# Patient Record
Sex: MALE | Race: WHITE | Employment: UNEMPLOYED | URBAN - METROPOLITAN AREA
[De-identification: names, ages, dates, MRNs, and addresses within clinical notes are randomized per-mention and may not be internally consistent; named-entity substitution may affect disease eponyms.]

---

## 2017-02-16 ENCOUNTER — HOSPITAL ENCOUNTER (OUTPATIENT)
Dept: PHYSICAL THERAPY | Age: 37
Discharge: HOME | End: 2017-02-16
Payer: COMMERCIAL

## 2017-02-16 PROCEDURE — 97163 PT EVAL HIGH COMPLEX 45 MIN: CPT

## 2017-02-16 NOTE — PROGRESS NOTES
Dhruv Briggs  : 1980 [address] [facility],[address]  [address] [place] [address].  [phone]   [phone]        OUTPATIENT PHYSICAL THERAPY:Initial Assessment 2017      ICD-10: Treatment Diagnosis: Pain in left shoulder (M25.512) , Stiffness of left shoulder, not elsewhere classified (M25.612)  Precautions/Allergies:   Bee sting [sting, bee] and Nicotine   Fall Risk Score: 3 (? 5 = High Risk)  MD Orders: Eval and Treat  MEDICAL/REFERRING DIAGNOSIS:  Recurrent dislocation, left shoulder [M24.412]  Impingement syndrome of left shoulder [M75.42]   DATE OF ONSET: Dec. 2016   REFERRING PHYSICIAN: Steve Reddy MD  RETURN PHYSICIAN APPOINTMENT: 3-17-17     INITIAL ASSESSMENT:  Mr. Sendy Lynch presents to therapy with pain in the L shoulder secondary to surgical debridement and SAD with Bankart repair back in December. Pt is still having difficulty with stiffness and ROM as well as decreased strength. Pt would benefit from skilled PT for stretching and strengthening as well as to return to functional and daily activities painfree. PROBLEM LIST (Impacting functional limitations):  1. Decreased Strength  2. Decreased ADL/Functional Activities  3. Decreased Balance  4. Increased Pain  5. Decreased Activity Tolerance  6. Decreased Flexibility/Joint Mobility INTERVENTIONS PLANNED:  1. Cold  2. Electrical Stimulation  3. Heat  4. Home Exercise Program (HEP)  5. Iontophoresis  6. Manual Therapy  7. Therapeutic Activites  8. Therapeutic Exercise/Strengthening  9. Ultrasound (US)   TREATMENT PLAN:  Effective Dates: 17 TO 3-31-17. Frequency/Duration: 2 times a week for 6 weeks  GOALS: (Goals have been discussed and agreed upon with patient.)  Short-Term Functional Goals: Time Frame: 2 weeks   1. Mr. Sendy Lynch to be independent with HEP. 2. Pt able to have no pain at rest in the L shoulder 100% of time.    3. Pt to tolerate full PROM in the L shoulder to gain return of mobility in the shoulder. Discharge Goals: Time Frame: 6 weeks   1. Pt able to demo full AROM in the L shoulder as prior level of function. 2. Pt to improve scapulohumeral rhythm and stability in the L shoulder to regain proper movement of the L shoulder to prevent subluxations. 3. Pt able to return to full functional activities painfree in the L shoulder. Rehabilitation Potential For Stated Goals: Good  Regarding Nico Llamas therapy, I certify that the treatment plan above will be carried out by a therapist or under their direction. Thank you for this referral,  Belinda Goltz, PT, DPT       Referring Physician Signature: Steve Reddy MD              Date                      HISTORY:   History of Present Injury/Illness (Reason for Referral):  Pt 43 y/o M s/p Bankart repair and SAD. Pt had surgery back in Dec due to frequent dislocations and subluxations due to frequent seizures (2-10 times a year). Pt since then had no therapy until now but is having a lot of pain. Pt is very stiff and is not moving the shoulder due to the pain. Pt has not had anymore seizures since then but is having trouble with mobility of the shoulder when trying to lift higher than 90 ° . Pt seems to be very educated on pain medication and repeatedly asked for medication due to the pain. Pt stated he discussed this with the doctor and he is currently out of meds. Pt refused to allow therapist to move the shoulder and stated he would not move the shoulder until pain medication was received. Past Medical History/Comorbidities:   Mr. Sendy Lynch  has a past medical history of Anxiety; Chronic pain; Ill-defined condition; Other ill-defined conditions(799.89); and Seizures ([facility] [address] He also has no past medical history of Difficult intubation; Malignant hyperthermia due to anesthesia; Nausea & vomiting; or Pseudocholinesterase deficiency. Mr. Sendy Lynch  has a past surgical history that includes orthopaedic (2016).   Social History/Living Environment:     Lives with father and step mom (Caregivers)   Prior Level of Function/Work/Activity:  Disabled   Dominant Side:         RIGHT  Previous Treatment Approaches:          None (refused since surgery)   Personal Factors:          Overall Behavior:  Depression, anxiety, (Possible drug seeking behavior)   Current Medications:    Current Outpatient Prescriptions:     ketorolac (TORADOL) 10 mg tablet, Take 1 Tab by mouth every six (6) hours as needed for Pain., Disp: 60 Tab, Rfl: 1    ibuprofen (MOTRIN) 800 mg tablet, Take 1 Tab by mouth every eight (8) hours as needed for Pain., Disp: 90 Tab, Rfl: 1    lamoTRIgine (LAMICTAL) 150 mg tablet, Take 150 mg by mouth two (2) times a day., Disp: , Rfl:     clonazePAM (KLONOPIN) 1 mg tablet, Take 1 mg by mouth four (4) times daily. , Disp: , Rfl:     sertraline (ZOLOFT) 50 mg tablet, Take 50 mg by mouth nightly., Disp: , Rfl:     EPIPEN JR 2-ROMI 0.15 mg/0.3 mL injection, INJECT 0.3ML INTO THE MUSCLE ONCE AS NEEDED FOR ANAPHYLAXIS, Disp: , Rfl: 2   Date Last Reviewed:  2/16/2017   # of Personal Factors/Comorbidities that affect the Plan of Care: 3+: HIGH COMPLEXITY   EXAMINATION:   Observation/Orthostatic Postural Assessment:          Guarded posture over L shoulder  Palpation:          N/a   ROM:     L shoulder AROM      Flexion: 90 °       Extension: 30 °       Abduction: 90 °       ER: 15 °       IR: 25 °    R shoulder       Flexion:       Extension:       Abduction:       ER:       IR:    Strength:     L shoulder (NT - pt refused)       Flexion:      Extension:      Abduction:      ER:      IR:    R shoulder       Flexion:       Extension:       Abduction:       ER:       IR:    Special Tests:          N/a   Neurological Screen:        Sensation: complaint of numbness in the 5th digit   Functional Mobility:         Independent   Balance:          Good    Body Structures Involved:  1. Nerves  2. Joints  3. Muscles  4.  Ligaments Body Functions Affected:  1. Mental  2. Sensory/Pain  3. Neuromusculoskeletal  4. Movement Related Activities and Participation Affected:  1. General Tasks and Demands  2. Mobility  3. Community, Social and [place]   # of elements that affect the Plan of Care: 4+: HIGH COMPLEXITY   CLINICAL PRESENTATION:   Presentation: Evolving clinical presentation with unstable and unpredictable characteristics: HIGH COMPLEXITY   CLINICAL DECISION MAKING:   Outcome Measure: Tool Used: UEFI  Score:  Initial: 2/80 Most Recent: X (Date: -- )   Interpretation of Score: 98% impaired   Outcome Measure Conversion Site    Medical Necessity:   · Patient is expected to demonstrate progress in strength and range of motion to increase independence with all functional activities painfree in the L shoulder. .  Reason for Services/Other Comments:  · Patient continues to require present interventions due to patient's inability to perform functional activities painfree in the L shoulder. .   Use of outcome tool(s) and clinical judgement create a POC that gives a: Difficult prediction of patient's progress: HIGH COMPLEXITY   TREATMENT:   (In addition to Assessment/Re-Assessment sessions the following treatments were rendered)  THERAPEUTIC EXERCISE: (see below for minutes):  Exercises per grid below to improve mobility and strength. Required moderate visual and verbal cues to promote proper body alignment, promote proper body posture and promote proper body mechanics. Progressed resistance, range and repetitions as indicated. MANUAL THERAPY: (see below for minutes): Joint mobilization and Soft tissue mobilization was utilized and necessary because of the patient's restricted joint motion and restricted motion of soft tissue. MODALITIES: (see below for minutes):      see chart below for details.       Date:        Modalities:                                Therapeutic Exercise:                                                        Proprioceptive Activities:                                Manual Therapy:                        Functional Activities:                                        HEP: pt was given pulleys today and educated on how to use at home for flexion and abduction. Treatment/Session Assessment:  Pt seems very concerned with pain medication and stated he was not going to move the arm until he had a refill of some kind of pain medication. Pt seems very educated on pain medication choices and he was fixated on pain medication during eval only. Pt continued to ask the therapist for pain medication and was told he would have to call the doctor regarding the pain. Pt would not allow therapist to move the shoulder into any PROM due to the pain and stated he didn't want to do therapy without having any pain medication. · Pain/ Symptoms: Initial:   8/10  Post Session:  8/10  ·   Compliance with Program/Exercises: Will assess as treatment progresses. · Recommendations/Intent for next treatment session: \"Next visit will focus on advancements to more challenging activities\".   Total Treatment Duration:  PT Patient Time In/Time Out  Time In: 1100  Time Out: 1145     Madina Gordillo, PT, DPT

## 2017-03-03 ENCOUNTER — HOSPITAL ENCOUNTER (OUTPATIENT)
Dept: PHYSICAL THERAPY | Age: 37
Discharge: HOME | End: 2017-03-03
Payer: COMMERCIAL

## 2017-03-03 PROCEDURE — 97110 THERAPEUTIC EXERCISES: CPT

## 2017-03-03 PROCEDURE — 97140 MANUAL THERAPY 1/> REGIONS: CPT

## 2017-03-03 NOTE — PROGRESS NOTES
Keyla Hilton  : 1980 [address] [facility],[address]  [address] [place] [address]  [phone]   [phone]        OUTPATIENT PHYSICAL THERAPY:Daily Note 3/3/2017      ICD-10: Treatment Diagnosis: Pain in left shoulder (M25.512) , Stiffness of left shoulder, not elsewhere classified (M25.612)  Precautions/Allergies:   Bee sting [sting, bee] and Nicotine   Fall Risk Score: 3 (? 5 = High Risk)  MD Orders: Eval and Treat  MEDICAL/REFERRING DIAGNOSIS:  Recurrent dislocation, left shoulder [M24.412]  Impingement syndrome of left shoulder [M75.42]   DATE OF ONSET: Dec. 2016   REFERRING PHYSICIAN: Jero Villeda MD  RETURN PHYSICIAN APPOINTMENT: 3-17-17     INITIAL ASSESSMENT:  Mr. Deisy Lubin presents to therapy with pain in the L shoulder secondary to surgical debridement and SAD with Bankart repair back in December. Pt is still having difficulty with stiffness and ROM as well as decreased strength. Pt would benefit from skilled PT for stretching and strengthening as well as to return to functional and daily activities painfree. PROBLEM LIST (Impacting functional limitations):  1. Decreased Strength  2. Decreased ADL/Functional Activities  3. Decreased Balance  4. Increased Pain  5. Decreased Activity Tolerance  6. Decreased Flexibility/Joint Mobility INTERVENTIONS PLANNED:  1. Cold  2. Electrical Stimulation  3. Heat  4. Home Exercise Program (HEP)  5. Iontophoresis  6. Manual Therapy  7. Therapeutic Activites  8. Therapeutic Exercise/Strengthening  9. Ultrasound (US)   TREATMENT PLAN:  Effective Dates: 17 TO 3-31-17. Frequency/Duration: 2 times a week for 6 weeks  GOALS: (Goals have been discussed and agreed upon with patient.)  Short-Term Functional Goals: Time Frame: 2 weeks   1. Mr. Deisy Lubin to be independent with HEP. 2. Pt able to have no pain at rest in the L shoulder 100% of time.    3. Pt to tolerate full PROM in the L shoulder to gain return of mobility in the shoulder. Discharge Goals: Time Frame: 6 weeks   1. Pt able to demo full AROM in the L shoulder as prior level of function. 2. Pt to improve scapulohumeral rhythm and stability in the L shoulder to regain proper movement of the L shoulder to prevent subluxations. 3. Pt able to return to full functional activities painfree in the L shoulder. Rehabilitation Potential For Stated Goals: Good                HISTORY:   History of Present Injury/Illness (Reason for Referral):  Pt 45 y/o M s/p Bankart repair and SAD. Pt had surgery back in Dec due to frequent dislocations and subluxations due to frequent seizures (2-10 times a year). Pt since then had no therapy until now but is having a lot of pain. Pt is very stiff and is not moving the shoulder due to the pain. Pt has not had anymore seizures since then but is having trouble with mobility of the shoulder when trying to lift higher than 90 ° . Pt seems to be very educated on pain medication and repeatedly asked for medication due to the pain. Pt stated he discussed this with the doctor and he is currently out of meds. Pt refused to allow therapist to move the shoulder and stated he would not move the shoulder until pain medication was received. Past Medical History/Comorbidities:   Mr. Sanna Marcus  has a past medical history of Anxiety; Chronic pain; Ill-defined condition; Other ill-defined conditions(799.89); and Seizures ([address] He also has no past medical history of Difficult intubation; Malignant hyperthermia due to anesthesia; Nausea & vomiting; or Pseudocholinesterase deficiency. Mr. Sanna Marcus  has a past surgical history that includes orthopaedic (2016) and orthopaedic (2017).   Social History/Living Environment:     Lives with father and step mom (Caregivers)   Prior Level of Function/Work/Activity:  Disabled   Dominant Side:         RIGHT  Previous Treatment Approaches:          None (refused since surgery)   Personal Factors:          Overall Behavior: Depression, anxiety, (Possible drug seeking behavior)   Current Medications:    Current Outpatient Prescriptions:     tiZANidine (ZANAFLEX) 2 mg tablet, Take 1 Tab by mouth three (3) times daily. , Disp: 30 Tab, Rfl: 0    ibuprofen (MOTRIN) 800 mg tablet, Take 1 Tab by mouth every eight (8) hours as needed for Pain., Disp: 90 Tab, Rfl: 1    lamoTRIgine (LAMICTAL) 150 mg tablet, Take 150 mg by mouth two (2) times a day., Disp: , Rfl:     clonazePAM (KLONOPIN) 1 mg tablet, Take 1 mg by mouth four (4) times daily. , Disp: , Rfl:     sertraline (ZOLOFT) 50 mg tablet, Take 50 mg by mouth nightly., Disp: , Rfl:     EPIPEN JR 2-ROMI 0.15 mg/0.3 mL injection, INJECT 0.3ML INTO THE MUSCLE ONCE AS NEEDED FOR ANAPHYLAXIS, Disp: , Rfl: 2   Date Last Reviewed:  3/3/2017   EXAMINATION:   Observation/Orthostatic Postural Assessment:          Guarded posture over L shoulder  Palpation:          N/a   ROM:     L shoulder AROM      Flexion: 90 °       Extension: 30 °       Abduction: 90 °       ER: 15 °       IR: 25 °    R shoulder       Flexion:       Extension:       Abduction:       ER:       IR:    Strength:     L shoulder (NT - pt refused)       Flexion:      Extension:      Abduction:      ER:      IR:    R shoulder       Flexion:       Extension:       Abduction:       ER:       IR:    Special Tests:          N/a   Neurological Screen:        Sensation: complaint of numbness in the 5th digit   Functional Mobility:         Independent   Balance:          Good    Body Structures Involved:  1. Nerves  2. Joints  3. Muscles  4. Ligaments Body Functions Affected:  1. Mental  2. Sensory/Pain  3. Neuromusculoskeletal  4. Movement Related Activities and Participation Affected:  1. General Tasks and Demands  2. Mobility  3. Community, Social and Civic Life   CLINICAL PRESENTATION:   CLINICAL DECISION MAKING:   Outcome Measure:    Tool Used: UEFI  Score:  Initial: 2/80 Most Recent: X (Date: -- )   Interpretation of Score: 98% impaired Outcome Measure Conversion Site    Medical Necessity:   · Patient is expected to demonstrate progress in strength and range of motion to increase independence with all functional activities painfree in the L shoulder. .  Reason for Services/Other Comments:  · Patient continues to require present interventions due to patient's inability to perform functional activities painfree in the L shoulder. .   TREATMENT:   (In addition to Assessment/Re-Assessment sessions the following treatments were rendered)  THERAPEUTIC EXERCISE: (see below for minutes):  Exercises per grid below to improve mobility and strength. Required moderate visual and verbal cues to promote proper body alignment, promote proper body posture and promote proper body mechanics. Progressed resistance, range and repetitions as indicated. MANUAL THERAPY: (see below for minutes): Joint mobilization and Soft tissue mobilization was utilized and necessary because of the patient's restricted joint motion and restricted motion of soft tissue. MODALITIES: (see below for minutes):      see chart below for details. Date: 3-3-17       Modalities:                                Therapeutic Exercise: 30 mins        Pulleys  x30 flexion and scaption       5 way isometrics  x30 each        Pendulums with 2#  x30 CW/CCW                                Proprioceptive Activities:                                Manual Therapy: 10 mins        PROM  10 mins                Functional Activities:                                        HEP: Pulleys and isometrics   Treatment/Session Assessment: Pt was able to tolerate all exercises and was told to continue using the pulleys at home and to add in isometrics which were printed and given to the patient. Pt agreed to understanding. Continue with POC. · Pain/ Symptoms: Initial:   7/10 Post Session:  8/10  ·   Compliance with Program/Exercises: Will assess as treatment progresses.   · Recommendations/Intent for next treatment session: \"Next visit will focus on advancements to more challenging activities\".   Total Treatment Duration:  PT Patient Time In/Time Out  Time In: 0800  Time Out: 0845     Sung Patterson, PT, DPT

## 2017-04-03 NOTE — PROGRESS NOTES
Jovanni Biswas  : 1980 [id number] [facility],[address]  [address] [place] [address]  [phone]   [phone]        OUTPATIENT PHYSICAL THERAPY:Discontinuation Summary 4/3/2017      ICD-10: Treatment Diagnosis: Pain in left shoulder (M25.512) , Stiffness of left shoulder, not elsewhere classified (M25.612)  Precautions/Allergies:   Bee sting [sting, bee] and Nicotine   Fall Risk Score: 3 (? 5 = High Risk)  MD Orders: Eval and Treat  MEDICAL/REFERRING DIAGNOSIS:  Recurrent dislocation, left shoulder [M24.412]  Impingement syndrome of left shoulder [M75.42]   DATE OF ONSET: Dec. 2016   REFERRING PHYSICIAN: Sheila Ford MD  RETURN PHYSICIAN APPOINTMENT: 3-17-17     INITIAL ASSESSMENT:  Mr. German Casillas presents to therapy with pain in the L shoulder secondary to surgical debridement and SAD with Bankart repair back in December. Pt is still having difficulty with stiffness and ROM as well as decreased strength. Pt would benefit from skilled PT for stretching and strengthening as well as to return to functional and daily activities painfree. PROBLEM LIST (Impacting functional limitations):  1. Decreased Strength  2. Decreased ADL/Functional Activities  3. Decreased Balance  4. Increased Pain  5. Decreased Activity Tolerance  6. Decreased Flexibility/Joint Mobility INTERVENTIONS PLANNED:  1. Cold  2. Electrical Stimulation  3. Heat  4. Home Exercise Program (HEP)  5. Iontophoresis  6. Manual Therapy  7. Therapeutic Activites  8. Therapeutic Exercise/Strengthening  9. Ultrasound (US)   TREATMENT PLAN:  Effective Dates: 17 TO 3-31-17. Frequency/Duration: 2 times a week for 6 weeks  GOALS: (Goals have been discussed and agreed upon with patient.)  Short-Term Functional Goals: Time Frame: 2 weeks   1. Mr. German Casillas to be independent with HEP. 2. Pt able to have no pain at rest in the L shoulder 100% of time.    3. Pt to tolerate full PROM in the L shoulder to gain return of mobility in the shoulder. Discharge Goals: Time Frame: 6 weeks   1. Pt able to demo full AROM in the L shoulder as prior level of function. 2. Pt to improve scapulohumeral rhythm and stability in the L shoulder to regain proper movement of the L shoulder to prevent subluxations. 3. Pt able to return to full functional activities painfree in the L shoulder. Rehabilitation Potential For Stated Goals: Good                HISTORY:   History of Present Injury/Illness (Reason for Referral):  Pt 43 y/o M s/p Bankart repair and SAD. Pt had surgery back in Dec due to frequent dislocations and subluxations due to frequent seizures (2-10 times a year). Pt since then had no therapy until now but is having a lot of pain. Pt is very stiff and is not moving the shoulder due to the pain. Pt has not had anymore seizures since then but is having trouble with mobility of the shoulder when trying to lift higher than 90 ° . Pt seems to be very educated on pain medication and repeatedly asked for medication due to the pain. Pt stated he discussed this with the doctor and he is currently out of meds. Pt refused to allow therapist to move the shoulder and stated he would not move the shoulder until pain medication was received. Past Medical History/Comorbidities:   Mr. Rai Molina  has a past medical history of Anxiety; Chronic pain; Ill-defined condition; Other ill-defined conditions(799.89); and Seizures ([address] He also has no past medical history of Difficult intubation; Malignant hyperthermia due to anesthesia; Nausea & vomiting; or Pseudocholinesterase deficiency. Mr. Rai Molina  has a past surgical history that includes orthopaedic (2016) and orthopaedic (2017).   Social History/Living Environment:     Lives with father and step mom (Caregivers)   Prior Level of Function/Work/Activity:  Disabled   Dominant Side:         RIGHT  Previous Treatment Approaches:          None (refused since surgery)   Personal Factors:          Overall Behavior:  Depression, anxiety, (Possible drug seeking behavior)   Current Medications:    Current Outpatient Prescriptions:     clonazePAM (KLONOPIN) 1 mg tablet, Take 1 Tab by mouth four (4) times daily. Max Daily Amount: 4 mg., Disp: 120 Tab, Rfl: 5    lamoTRIgine (LAMICTAL) 150 mg tablet, Take 1 Tab by mouth two (2) times a day., Disp: 60 Tab, Rfl: 6    sertraline (ZOLOFT) 50 mg tablet, Take 1 Tab by mouth nightly., Disp: 90 Tab, Rfl: 3    tiZANidine (ZANAFLEX) 2 mg tablet, Take 1 Tab by mouth three (3) times daily. , Disp: 30 Tab, Rfl: 0    ibuprofen (MOTRIN) 800 mg tablet, Take 1 Tab by mouth every eight (8) hours as needed for Pain., Disp: 90 Tab, Rfl: 1    EPIPEN JR 2-ROMI 0.15 mg/0.3 mL injection, INJECT 0.3ML INTO THE MUSCLE ONCE AS NEEDED FOR ANAPHYLAXIS, Disp: , Rfl: 2   Date Last Reviewed:  3/3/2017   EXAMINATION:   Observation/Orthostatic Postural Assessment:          Guarded posture over L shoulder  Palpation:          N/a   ROM:     L shoulder AROM      Flexion: 90 °       Extension: 30 °       Abduction: 90 °       ER: 15 °       IR: 25 °    R shoulder       Flexion:       Extension:       Abduction:       ER:       IR:    Strength:     L shoulder (NT - pt refused)       Flexion:      Extension:      Abduction:      ER:      IR:    R shoulder       Flexion:       Extension:       Abduction:       ER:       IR:    Special Tests:          N/a   Neurological Screen:        Sensation: complaint of numbness in the 5th digit   Functional Mobility:         Independent   Balance:          Good    Body Structures Involved:  1. Nerves  2. Joints  3. Muscles  4. Ligaments Body Functions Affected:  1. Mental  2. Sensory/Pain  3. Neuromusculoskeletal  4. Movement Related Activities and Participation Affected:  1. General Tasks and Demands  2. Mobility  3. Community, Social and Civic Life   CLINICAL PRESENTATION:   CLINICAL DECISION MAKING:   Outcome Measure:    Tool Used: UEFI  Score:  Initial: 2/80 Most Recent: X (Date: -- )   Interpretation of Score: 98% impaired   Outcome Measure Conversion Site    Medical Necessity:   · Patient is expected to demonstrate progress in strength and range of motion to increase independence with all functional activities painfree in the L shoulder. .  Reason for Services/Other Comments:  · Patient continues to require present interventions due to patient's inability to perform functional activities painfree in the L shoulder. .   TREATMENT:   (In addition to Assessment/Re-Assessment sessions the following treatments were rendered)  THERAPEUTIC EXERCISE: (see below for minutes):  Exercises per grid below to improve mobility and strength. Required moderate visual and verbal cues to promote proper body alignment, promote proper body posture and promote proper body mechanics. Progressed resistance, range and repetitions as indicated. MANUAL THERAPY: (see below for minutes): Joint mobilization and Soft tissue mobilization was utilized and necessary because of the patient's restricted joint motion and restricted motion of soft tissue. MODALITIES: (see below for minutes):      see chart below for details. Date: 3-3-17       Modalities:                                Therapeutic Exercise: 30 mins        Pulleys  x30 flexion and scaption       5 way isometrics  x30 each        Pendulums with 2#  x30 CW/CCW                                Proprioceptive Activities:                                Manual Therapy: 10 mins        PROM  10 mins                Functional Activities:                                        HEP: Pulleys and isometrics   Treatment/Session Assessment: Pt did not return to therapy following this appt. Pt is discontinued.      · Pain/ Symptoms: Initial:   7/10 Post Session:  8/10       Kerri Quesada, PT, DPT

## 2017-07-13 ENCOUNTER — HOSPITAL ENCOUNTER (OUTPATIENT)
Dept: GENERAL RADIOLOGY | Age: 37
Discharge: HOME | End: 2017-07-13
Attending: NURSE PRACTITIONER
Payer: COMMERCIAL

## 2017-07-13 DIAGNOSIS — M54.50 ACUTE BILATERAL LOW BACK PAIN WITHOUT SCIATICA: ICD-10-CM

## 2017-07-13 PROBLEM — G40.209 PARTIAL SYMPTOMATIC EPILEPSY WITH COMPLEX PARTIAL SEIZURES, NOT INTRACTABLE, WITHOUT STATUS EPILEPTICUS (HCC): Status: ACTIVE | Noted: 2017-07-13

## 2017-07-13 PROCEDURE — 72100 X-RAY EXAM L-S SPINE 2/3 VWS: CPT

## 2017-07-13 PROCEDURE — 72072 X-RAY EXAM THORAC SPINE 3VWS: CPT

## 2017-07-25 ENCOUNTER — HOSPITAL ENCOUNTER (OUTPATIENT)
Dept: MRI IMAGING | Age: 37
Discharge: HOME | End: 2017-07-25
Attending: NURSE PRACTITIONER
Payer: COMMERCIAL

## 2017-07-25 VITALS — BODY MASS INDEX: 20.98 KG/M2 | WEIGHT: 130 LBS

## 2017-07-25 DIAGNOSIS — G40.909 SEIZURE DISORDER (HCC): ICD-10-CM

## 2017-07-25 PROCEDURE — 70553 MRI BRAIN STEM W/O & W/DYE: CPT

## 2017-07-25 PROCEDURE — 74011250636 HC RX REV CODE- 250/636

## 2017-07-25 PROCEDURE — A9577 INJ MULTIHANCE: HCPCS

## 2017-07-25 RX ORDER — SODIUM CHLORIDE 0.9 % (FLUSH) 0.9 %
10 SYRINGE (ML) INJECTION
Status: COMPLETED | OUTPATIENT
Start: 2017-07-25 | End: 2017-07-25

## 2017-07-25 RX ADMIN — GADOBENATE DIMEGLUMINE 13 ML: 529 INJECTION, SOLUTION INTRAVENOUS at 08:39

## 2017-07-25 RX ADMIN — Medication 10 ML: at 08:39

## 2022-03-20 PROBLEM — G40.209 PARTIAL SYMPTOMATIC EPILEPSY WITH COMPLEX PARTIAL SEIZURES, NOT INTRACTABLE, WITHOUT STATUS EPILEPTICUS (HCC): Status: ACTIVE | Noted: 2017-07-13

## 2022-09-22 ENCOUNTER — HOSPITAL ENCOUNTER (EMERGENCY)
Dept: CT IMAGING | Age: 42
Discharge: HOME | DRG: 153 | End: 2022-09-25
Payer: COMMERCIAL

## 2022-09-22 ENCOUNTER — HOSPITAL ENCOUNTER (INPATIENT)
Age: 42
LOS: 2 days | Discharge: HOME | DRG: 153 | End: 2022-09-24
Attending: EMERGENCY MEDICINE | Admitting: FAMILY MEDICINE
Payer: COMMERCIAL

## 2022-09-22 DIAGNOSIS — J34.0 NASAL ABSCESS: Primary | ICD-10-CM

## 2022-09-22 PROBLEM — F11.21 OPIOID USE DISORDER, SEVERE, IN SUSTAINED REMISSION, ON MAINTENANCE THERAPY, DEPENDENCE (HCC): Chronic | Status: ACTIVE | Noted: 2022-09-22

## 2022-09-22 PROBLEM — K04.7 ABSCESS, DENTAL: Status: ACTIVE | Noted: 2022-09-22

## 2022-09-22 PROBLEM — R45.851 SUICIDAL IDEATIONS: Status: RESOLVED | Noted: 2018-12-14 | Resolved: 2022-09-22

## 2022-09-22 PROBLEM — E87.1 HYPONATREMIA: Status: ACTIVE | Noted: 2022-09-22

## 2022-09-22 PROBLEM — B96.89 ACUTE BACTERIAL RHINOSINUSITIS: Status: ACTIVE | Noted: 2022-09-22

## 2022-09-22 PROBLEM — J01.90 ACUTE BACTERIAL RHINOSINUSITIS: Status: ACTIVE | Noted: 2022-09-22

## 2022-09-22 PROBLEM — F99 INSOMNIA DUE TO OTHER MENTAL DISORDER: Chronic | Status: ACTIVE | Noted: 2022-09-22

## 2022-09-22 PROBLEM — F19.94 SUBSTANCE OR MEDICATION-INDUCED DEPRESSIVE DISORDER (HCC): Status: ACTIVE | Noted: 2018-12-17

## 2022-09-22 PROBLEM — F51.05 INSOMNIA DUE TO OTHER MENTAL DISORDER: Chronic | Status: ACTIVE | Noted: 2022-09-22

## 2022-09-22 PROBLEM — R45.851 SUICIDAL IDEATIONS: Status: ACTIVE | Noted: 2018-12-14

## 2022-09-22 PROBLEM — E87.6 HYPOKALEMIA: Status: ACTIVE | Noted: 2022-09-22

## 2022-09-22 PROBLEM — J32.9: Status: ACTIVE | Noted: 2022-09-22

## 2022-09-22 LAB
ALBUMIN SERPL-MCNC: 3.7 G/DL (ref 3.5–5)
ALBUMIN/GLOB SERPL: 0.8 {RATIO} (ref 1.2–3.5)
ALP SERPL-CCNC: 134 U/L (ref 50–136)
ALT SERPL-CCNC: 43 U/L (ref 12–65)
ANION GAP SERPL CALC-SCNC: 8 MMOL/L (ref 4–13)
AST SERPL-CCNC: 49 U/L (ref 15–37)
BASOPHILS # BLD: 0 K/UL (ref 0–0.2)
BASOPHILS NFR BLD: 0 % (ref 0–2)
BILIRUB SERPL-MCNC: 0.7 MG/DL (ref 0.2–1.1)
BUN SERPL-MCNC: 10 MG/DL (ref 6–23)
CALCIUM SERPL-MCNC: 9.7 MG/DL (ref 8.3–10.4)
CHLORIDE SERPL-SCNC: 100 MMOL/L (ref 101–110)
CO2 SERPL-SCNC: 26 MMOL/L (ref 21–32)
CREAT SERPL-MCNC: 0.6 MG/DL (ref 0.8–1.5)
CRP SERPL-MCNC: 16.6 MG/DL (ref 0–0.9)
DIFFERENTIAL METHOD BLD: ABNORMAL
EOSINOPHIL # BLD: 0.1 K/UL (ref 0–0.8)
EOSINOPHIL NFR BLD: 0 % (ref 0.5–7.8)
ERYTHROCYTE [DISTWIDTH] IN BLOOD BY AUTOMATED COUNT: 13.2 % (ref 11.9–14.6)
ERYTHROCYTE [SEDIMENTATION RATE] IN BLOOD: 88 MM/HR (ref 0–20)
GLOBULIN SER CALC-MCNC: 4.5 G/DL (ref 2.3–3.5)
GLUCOSE SERPL-MCNC: 132 MG/DL (ref 65–100)
HCT VFR BLD AUTO: 35.4 % (ref 41.1–50.3)
HGB BLD-MCNC: 12.2 G/DL (ref 13.6–17.2)
IMM GRANULOCYTES # BLD AUTO: 0.1 K/UL (ref 0–0.5)
IMM GRANULOCYTES NFR BLD AUTO: 1 % (ref 0–5)
LACTATE SERPL-SCNC: 0.7 MMOL/L (ref 0.4–2)
LYMPHOCYTES # BLD: 1.6 K/UL (ref 0.5–4.6)
LYMPHOCYTES NFR BLD: 8 % (ref 13–44)
MCH RBC QN AUTO: 31.3 PG (ref 26.1–32.9)
MCHC RBC AUTO-ENTMCNC: 34.5 G/DL (ref 31.4–35)
MCV RBC AUTO: 90.8 FL (ref 79.6–97.8)
MONOCYTES # BLD: 1.4 K/UL (ref 0.1–1.3)
MONOCYTES NFR BLD: 7 % (ref 4–12)
NEUTS SEG # BLD: 15.7 K/UL (ref 1.7–8.2)
NEUTS SEG NFR BLD: 84 % (ref 43–78)
NRBC # BLD: 0 K/UL (ref 0–0.2)
PLATELET # BLD AUTO: 417 K/UL (ref 150–450)
PMV BLD AUTO: 8.8 FL (ref 9.4–12.3)
POTASSIUM SERPL-SCNC: 3.3 MMOL/L (ref 3.5–5.1)
PROT SERPL-MCNC: 8.2 G/DL (ref 6.3–8.2)
RBC # BLD AUTO: 3.9 M/UL (ref 4.23–5.6)
SODIUM SERPL-SCNC: 134 MMOL/L (ref 138–145)
WBC # BLD AUTO: 18.9 K/UL (ref 4.3–11.1)

## 2022-09-22 PROCEDURE — G0378 HOSPITAL OBSERVATION PER HR: HCPCS

## 2022-09-22 PROCEDURE — 87186 SC STD MICRODIL/AGAR DIL: CPT

## 2022-09-22 PROCEDURE — 99253 IP/OBS CNSLTJ NEW/EST LOW 45: CPT | Performed by: STUDENT IN AN ORGANIZED HEALTH CARE EDUCATION/TRAINING PROGRAM

## 2022-09-22 PROCEDURE — 96374 THER/PROPH/DIAG INJ IV PUSH: CPT

## 2022-09-22 PROCEDURE — 87077 CULTURE AEROBIC IDENTIFY: CPT

## 2022-09-22 PROCEDURE — 1100000000 HC RM PRIVATE

## 2022-09-22 PROCEDURE — 6360000002 HC RX W HCPCS: Performed by: EMERGENCY MEDICINE

## 2022-09-22 PROCEDURE — 96365 THER/PROPH/DIAG IV INF INIT: CPT

## 2022-09-22 PROCEDURE — 80053 COMPREHEN METABOLIC PANEL: CPT

## 2022-09-22 PROCEDURE — 87070 CULTURE OTHR SPECIMN AEROBIC: CPT

## 2022-09-22 PROCEDURE — 99285 EMERGENCY DEPT VISIT HI MDM: CPT

## 2022-09-22 PROCEDURE — 2580000003 HC RX 258: Performed by: STUDENT IN AN ORGANIZED HEALTH CARE EDUCATION/TRAINING PROGRAM

## 2022-09-22 PROCEDURE — 85025 COMPLETE CBC W/AUTO DIFF WBC: CPT

## 2022-09-22 PROCEDURE — 87040 BLOOD CULTURE FOR BACTERIA: CPT

## 2022-09-22 PROCEDURE — 2500000003 HC RX 250 WO HCPCS: Performed by: STUDENT IN AN ORGANIZED HEALTH CARE EDUCATION/TRAINING PROGRAM

## 2022-09-22 PROCEDURE — 70487 CT MAXILLOFACIAL W/DYE: CPT

## 2022-09-22 PROCEDURE — 85652 RBC SED RATE AUTOMATED: CPT

## 2022-09-22 PROCEDURE — 6370000000 HC RX 637 (ALT 250 FOR IP): Performed by: STUDENT IN AN ORGANIZED HEALTH CARE EDUCATION/TRAINING PROGRAM

## 2022-09-22 PROCEDURE — 96366 THER/PROPH/DIAG IV INF ADDON: CPT

## 2022-09-22 PROCEDURE — 6360000004 HC RX CONTRAST MEDICATION: Performed by: SPECIALIST

## 2022-09-22 PROCEDURE — 83605 ASSAY OF LACTIC ACID: CPT

## 2022-09-22 PROCEDURE — 86140 C-REACTIVE PROTEIN: CPT

## 2022-09-22 PROCEDURE — 30020 DRAINAGE OF NOSE LESION: CPT | Performed by: STUDENT IN AN ORGANIZED HEALTH CARE EDUCATION/TRAINING PROGRAM

## 2022-09-22 RX ORDER — SODIUM CHLORIDE 0.9 % (FLUSH) 0.9 %
10 SYRINGE (ML) INJECTION
Status: DISCONTINUED | OUTPATIENT
Start: 2022-09-22 | End: 2022-09-22 | Stop reason: SDUPTHER

## 2022-09-22 RX ORDER — IBUPROFEN 800 MG/1
800 TABLET ORAL
Status: COMPLETED | OUTPATIENT
Start: 2022-09-22 | End: 2022-09-23

## 2022-09-22 RX ORDER — POTASSIUM CHLORIDE 20 MEQ/1
40 TABLET, EXTENDED RELEASE ORAL ONCE
Status: COMPLETED | OUTPATIENT
Start: 2022-09-22 | End: 2022-09-23

## 2022-09-22 RX ORDER — BUPRENORPHINE HYDROCHLORIDE AND NALOXONE HYDROCHLORIDE DIHYDRATE 8; 2 MG/1; MG/1
1 TABLET SUBLINGUAL DAILY
Status: DISCONTINUED | OUTPATIENT
Start: 2022-09-23 | End: 2022-09-24 | Stop reason: HOSPADM

## 2022-09-22 RX ORDER — SODIUM CHLORIDE 9 MG/ML
INJECTION, SOLUTION INTRAVENOUS PRN
Status: DISCONTINUED | OUTPATIENT
Start: 2022-09-22 | End: 2022-09-24 | Stop reason: HOSPADM

## 2022-09-22 RX ORDER — PRAZOSIN HYDROCHLORIDE 1 MG/1
2 CAPSULE ORAL NIGHTLY
Status: DISCONTINUED | OUTPATIENT
Start: 2022-09-22 | End: 2022-09-24 | Stop reason: HOSPADM

## 2022-09-22 RX ORDER — CIPROFLOXACIN 500 MG/1
500 TABLET, FILM COATED ORAL EVERY 12 HOURS
Status: DISCONTINUED | OUTPATIENT
Start: 2022-09-22 | End: 2022-09-23

## 2022-09-22 RX ORDER — MIRTAZAPINE 15 MG/1
15 TABLET, FILM COATED ORAL NIGHTLY
Status: DISCONTINUED | OUTPATIENT
Start: 2022-09-22 | End: 2022-09-24 | Stop reason: HOSPADM

## 2022-09-22 RX ORDER — ACETAMINOPHEN 650 MG/1
650 SUPPOSITORY RECTAL EVERY 6 HOURS PRN
Status: DISCONTINUED | OUTPATIENT
Start: 2022-09-22 | End: 2022-09-24 | Stop reason: HOSPADM

## 2022-09-22 RX ORDER — RISPERIDONE 0.5 MG/1
0.5 TABLET, FILM COATED ORAL NIGHTLY
Status: DISCONTINUED | OUTPATIENT
Start: 2022-09-22 | End: 2022-09-24 | Stop reason: HOSPADM

## 2022-09-22 RX ORDER — FLUTICASONE PROPIONATE 50 MCG
1 SPRAY, SUSPENSION (ML) NASAL DAILY
Status: DISCONTINUED | OUTPATIENT
Start: 2022-09-23 | End: 2022-09-22

## 2022-09-22 RX ORDER — SODIUM CHLORIDE 0.9 % (FLUSH) 0.9 %
5-40 SYRINGE (ML) INJECTION PRN
Status: DISCONTINUED | OUTPATIENT
Start: 2022-09-22 | End: 2022-09-24 | Stop reason: HOSPADM

## 2022-09-22 RX ORDER — ONDANSETRON 2 MG/ML
4 INJECTION INTRAMUSCULAR; INTRAVENOUS EVERY 6 HOURS PRN
Status: DISCONTINUED | OUTPATIENT
Start: 2022-09-22 | End: 2022-09-24 | Stop reason: HOSPADM

## 2022-09-22 RX ORDER — POLYETHYLENE GLYCOL 3350 17 G/17G
17 POWDER, FOR SOLUTION ORAL DAILY PRN
Status: DISCONTINUED | OUTPATIENT
Start: 2022-09-22 | End: 2022-09-24 | Stop reason: HOSPADM

## 2022-09-22 RX ORDER — POTASSIUM CHLORIDE 20 MEQ/1
40 TABLET, EXTENDED RELEASE ORAL PRN
Status: DISCONTINUED | OUTPATIENT
Start: 2022-09-22 | End: 2022-09-24 | Stop reason: HOSPADM

## 2022-09-22 RX ORDER — 0.9 % SODIUM CHLORIDE 0.9 %
100 INTRAVENOUS SOLUTION INTRAVENOUS
Status: DISCONTINUED | OUTPATIENT
Start: 2022-09-22 | End: 2022-09-26 | Stop reason: HOSPADM

## 2022-09-22 RX ORDER — NICOTINE 21 MG/24HR
1 PATCH, TRANSDERMAL 24 HOURS TRANSDERMAL DAILY PRN
Status: DISCONTINUED | OUTPATIENT
Start: 2022-09-22 | End: 2022-09-24 | Stop reason: HOSPADM

## 2022-09-22 RX ORDER — ONDANSETRON 4 MG/1
4 TABLET, ORALLY DISINTEGRATING ORAL EVERY 8 HOURS PRN
Status: DISCONTINUED | OUTPATIENT
Start: 2022-09-22 | End: 2022-09-24 | Stop reason: HOSPADM

## 2022-09-22 RX ORDER — ENOXAPARIN SODIUM 100 MG/ML
40 INJECTION SUBCUTANEOUS DAILY
Status: DISCONTINUED | OUTPATIENT
Start: 2022-09-23 | End: 2022-09-24 | Stop reason: HOSPADM

## 2022-09-22 RX ORDER — BUPRENORPHINE HYDROCHLORIDE AND NALOXONE HYDROCHLORIDE DIHYDRATE 8; 2 MG/1; MG/1
1 TABLET SUBLINGUAL
Status: COMPLETED | OUTPATIENT
Start: 2022-09-22 | End: 2022-09-22

## 2022-09-22 RX ORDER — FLUTICASONE PROPIONATE 50 MCG
2 SPRAY, SUSPENSION (ML) NASAL 4 TIMES DAILY
Status: DISCONTINUED | OUTPATIENT
Start: 2022-09-22 | End: 2022-09-24 | Stop reason: HOSPADM

## 2022-09-22 RX ORDER — 0.9 % SODIUM CHLORIDE 0.9 %
1000 INTRAVENOUS SOLUTION INTRAVENOUS
Status: COMPLETED | OUTPATIENT
Start: 2022-09-22 | End: 2022-09-22

## 2022-09-22 RX ORDER — ONDANSETRON 4 MG/1
4 TABLET, ORALLY DISINTEGRATING ORAL
Status: COMPLETED | OUTPATIENT
Start: 2022-09-22 | End: 2022-09-22

## 2022-09-22 RX ORDER — SODIUM CHLORIDE, SODIUM LACTATE, POTASSIUM CHLORIDE, CALCIUM CHLORIDE 600; 310; 30; 20 MG/100ML; MG/100ML; MG/100ML; MG/100ML
INJECTION, SOLUTION INTRAVENOUS CONTINUOUS
Status: DISCONTINUED | OUTPATIENT
Start: 2022-09-22 | End: 2022-09-23

## 2022-09-22 RX ORDER — MAGNESIUM SULFATE IN WATER 40 MG/ML
2000 INJECTION, SOLUTION INTRAVENOUS PRN
Status: DISCONTINUED | OUTPATIENT
Start: 2022-09-22 | End: 2022-09-24 | Stop reason: HOSPADM

## 2022-09-22 RX ORDER — SERTRALINE HYDROCHLORIDE 25 MG/1
50 TABLET, FILM COATED ORAL DAILY
Status: DISCONTINUED | OUTPATIENT
Start: 2022-09-23 | End: 2022-09-22

## 2022-09-22 RX ORDER — CLINDAMYCIN PHOSPHATE 900 MG/50ML
900 INJECTION INTRAVENOUS
Status: COMPLETED | OUTPATIENT
Start: 2022-09-22 | End: 2022-09-23

## 2022-09-22 RX ORDER — SERTRALINE HYDROCHLORIDE 100 MG/1
100 TABLET, FILM COATED ORAL DAILY
Status: DISCONTINUED | OUTPATIENT
Start: 2022-09-23 | End: 2022-09-24 | Stop reason: HOSPADM

## 2022-09-22 RX ORDER — CETIRIZINE HYDROCHLORIDE 10 MG/1
10 TABLET ORAL DAILY
Status: DISCONTINUED | OUTPATIENT
Start: 2022-09-23 | End: 2022-09-24 | Stop reason: HOSPADM

## 2022-09-22 RX ORDER — SODIUM CHLORIDE 0.9 % (FLUSH) 0.9 %
5-40 SYRINGE (ML) INJECTION EVERY 12 HOURS SCHEDULED
Status: DISCONTINUED | OUTPATIENT
Start: 2022-09-22 | End: 2022-09-24 | Stop reason: HOSPADM

## 2022-09-22 RX ORDER — ACETAMINOPHEN 325 MG/1
650 TABLET ORAL EVERY 6 HOURS PRN
Status: DISCONTINUED | OUTPATIENT
Start: 2022-09-22 | End: 2022-09-24 | Stop reason: HOSPADM

## 2022-09-22 RX ORDER — POTASSIUM CHLORIDE 7.45 MG/ML
10 INJECTION INTRAVENOUS PRN
Status: DISCONTINUED | OUTPATIENT
Start: 2022-09-22 | End: 2022-09-24 | Stop reason: HOSPADM

## 2022-09-22 RX ORDER — IBUPROFEN 400 MG/1
400 TABLET ORAL EVERY 6 HOURS PRN
Status: DISCONTINUED | OUTPATIENT
Start: 2022-09-22 | End: 2022-09-24 | Stop reason: HOSPADM

## 2022-09-22 RX ADMIN — POTASSIUM BICARBONATE 20 MEQ: 782 TABLET, EFFERVESCENT ORAL at 18:58

## 2022-09-22 RX ADMIN — BUPRENORPHINE AND NALOXONE 1 TABLET: 8; 2 TABLET SUBLINGUAL at 22:19

## 2022-09-22 RX ADMIN — SODIUM CHLORIDE 1000 ML: 9 INJECTION, SOLUTION INTRAVENOUS at 18:54

## 2022-09-22 RX ADMIN — IOPAMIDOL 100 ML: 755 INJECTION, SOLUTION INTRAVENOUS at 20:16

## 2022-09-22 RX ADMIN — ONDANSETRON 4 MG: 4 TABLET, ORALLY DISINTEGRATING ORAL at 18:57

## 2022-09-22 RX ADMIN — CLINDAMYCIN PHOSPHATE 900 MG: 900 INJECTION, SOLUTION INTRAVENOUS at 20:35

## 2022-09-22 ASSESSMENT — ENCOUNTER SYMPTOMS
COLOR CHANGE: 0
CHOKING: 0
FACIAL SWELLING: 1
ABDOMINAL PAIN: 0
CONSTIPATION: 0
EYE PAIN: 0
SINUS PAIN: 0
APNEA: 0
RHINORRHEA: 0
SINUS PRESSURE: 1
EYE REDNESS: 0
ABDOMINAL DISTENTION: 0
BLOOD IN STOOL: 0
DIARRHEA: 0
NAUSEA: 0
VOICE CHANGE: 0
SINUS PAIN: 1
VOMITING: 0
COUGH: 0
RHINORRHEA: 1
WHEEZING: 0
SORE THROAT: 0
STRIDOR: 0
CHEST TIGHTNESS: 0
SINUS PRESSURE: 0
EYE DISCHARGE: 0
COLOR CHANGE: 1
SHORTNESS OF BREATH: 0
PHOTOPHOBIA: 0

## 2022-09-22 ASSESSMENT — PAIN - FUNCTIONAL ASSESSMENT: PAIN_FUNCTIONAL_ASSESSMENT: 0-10

## 2022-09-22 ASSESSMENT — PAIN SCALES - GENERAL: PAINLEVEL_OUTOF10: 5

## 2022-09-22 NOTE — ED PROVIDER NOTES
Emergency Department Provider Note                   PCP:                LEVON Danielle NP               Age: 36 y.o. Sex: male       ICD-10-CM    1. Skin infection  L08.9           DISPOSITION          MDM  Number of Diagnoses or Management Options  Skin infection  Diagnosis management comments: 5:15 PM  Patient history, ROS, physical exam, labs, radiological workup and all pertinent findings were discussed with attending Tierney Winchester MD. He also evaluated patient. Suspect cellulitis vs abscess. Consulted Dr. Lenny Pablo from ENT. He advised CT to evaluate for abscess. If none, then d/c on ciprofloxacin. Getting labs. 7:50 PM  White count elevated. Not technically SIRS but borderline. Getting blood cultures, IV abx, fluids, lactate. Still waiting on CT.    7:54 PM  Workup still pending. Care is being transferred to attending physician Dr. Debbie Prince. Amount and/or Complexity of Data Reviewed  Clinical lab tests: ordered and reviewed  Tests in the radiology section of CPT®: ordered  Discuss the patient with other providers: yes    Risk of Complications, Morbidity, and/or Mortality  Presenting problems: moderate  Diagnostic procedures: moderate  Management options: moderate         ED Course as of 09/22/22 1954   Thu Sep 22, 2022   1840 6:41 PM  White count elevated. Crp elevated. Does not meet sirs at this time however he is borderline. Non toxic.  Will get blood cultures and lactic and start fluids [NR]      ED Course User Index  [NR] ORTEGA Milian        Orders Placed This Encounter   Procedures    Culture, Blood 1    CT MAXILLOFACIAL W CONTRAST    CBC with Auto Differential    Comprehensive Metabolic Panel    C-Reactive Protein    Sedimentation Rate    Lactic Acid        Medications   clindamycin (CLEOCIN) 900 mg in dextrose 5 % 50 mL IVPB (has no administration in time range)   potassium bicarb-citric acid (EFFER-K) effervescent tablet 20 mEq (20 mEq Oral Given 9/22/22 0318) 0.9 % sodium chloride bolus (1,000 mLs IntraVENous New Bag 9/22/22 1854)   ondansetron (ZOFRAN-ODT) disintegrating tablet 4 mg (4 mg Oral Given 9/22/22 1857)       New Prescriptions    No medications on file        Tiana Gonzalez is a 36 y.o. male who presents to the Emergency Department with chief complaint of    Chief Complaint   Patient presents with    Wound Infection      50-year-old male patient presents today complaining of nasal infection for the past 3 days. Describes his nose as a red, painful, hot, swollen. Reports he saw his Suboxone doctor who prescribed him a azithromycin. He reports he was not able to  the antibiotic and has not started it. Reports he was only able to get here today by ambulance but says his friends have agreed to help him get places in the next few days if he needs it. He denies fever, chills, headaches. States he is unable to breathe out of his nose but otherwise denies difficulty breathing, chest pain. Denies any other lesions anywhere else. Patient is primary historian and quality is reliable. The history is provided by the patient. No  was used. Review of Systems   Constitutional:  Negative for appetite change, chills, fatigue, fever and unexpected weight change. HENT:  Positive for congestion and facial swelling. Negative for ear pain, hearing loss, postnasal drip, rhinorrhea, sinus pressure, sore throat and voice change. Eyes:  Negative for photophobia, pain, discharge, redness and visual disturbance. Respiratory:  Negative for apnea, cough, chest tightness, shortness of breath and wheezing. Cardiovascular:  Negative for chest pain, palpitations and leg swelling. Gastrointestinal:  Negative for abdominal pain, blood in stool, constipation, diarrhea, nausea and vomiting. Endocrine: Negative for polydipsia, polyphagia and polyuria.    Genitourinary:  Negative for decreased urine volume, dysuria, flank pain, frequency and hematuria. Musculoskeletal:  Negative for arthralgias, joint swelling, myalgias and neck stiffness. Skin:  Positive for color change and rash. Negative for wound. Neurological:  Negative for dizziness, syncope, speech difficulty, weakness, light-headedness and headaches. Hematological:  Negative for adenopathy. Does not bruise/bleed easily. Psychiatric/Behavioral:  Negative for confusion, self-injury, sleep disturbance and suicidal ideas. All other systems reviewed and are negative. Past Medical History:   Diagnosis Date    Anxiety     Chronic pain     left shoulder    Ill-defined condition     broken back    Other ill-defined conditions(799.89)     F4,5, & 7 compression fx    Seizures ([facility])     states \"seizures are anxiety driven\". last seizure was 10/2016        Past Surgical History:   Procedure Laterality Date    ORTHOPEDIC SURGERY  2016    LT ankle    ORTHOPEDIC SURGERY  2017    LT shoulder        Family History   Problem Relation Age of Onset    Rheum Arthritis Mother     COPD Father     Anxiety Disorder Father     Anxiety Disorder Sister     Depression Sister     Bipolar Disorder Sister     Schizophrenia Brother         Social History     Socioeconomic History    Marital status: Single     Spouse name: None    Number of children: None    Years of education: None    Highest education level: None   Tobacco Use    Smoking status: Light Smoker     Packs/day: 0.25     Types: Cigarettes    Smokeless tobacco: Never   Substance and Sexual Activity    Alcohol use: No    Drug use: No         Wasp venom protein and Nicotine     Previous Medications    BACLOFEN (LIORESAL) 10 MG TABLET    Take 10 mg by mouth 3 times daily    CLONAZEPAM (KLONOPIN) 1 MG TABLET    Take 1 mg by mouth 2 times daily.     DULOXETINE (CYMBALTA) 30 MG EXTENDED RELEASE CAPSULE    Take 30 mg by mouth daily    EPINEPHRINE (EPIPEN JR 2-URSZULA) 0.15 MG/0.3ML SOAJ    INJECT 0.3ML INTO THE MUSCLE ONCE AS NEEDED FOR ANAPHYLAXIS    IBUPROFEN (ADVIL;MOTRIN) 800 MG TABLET    Take 800 mg by mouth every 8 hours as needed    LAMOTRIGINE (LAMICTAL) 200 MG TABLET    Take 200 mg by mouth 2 times daily    MIRTAZAPINE (REMERON) 15 MG TABLET    Take 15 mg by mouth    PRAZOSIN (MINIPRESS) 2 MG CAPSULE    Take 2 mg by mouth    RISPERIDONE (RISPERDAL) 0.5 MG TABLET    Take 0.5 mg by mouth    SERTRALINE (ZOLOFT) 50 MG TABLET    Take 50 mg by mouth daily    TRIAMCINOLONE (ARISTOCORT) 0.5 % OINTMENT    Apply topically 2 times daily        Vitals signs and nursing note reviewed. Patient Vitals for the past 4 hrs:   Temp Pulse Resp BP SpO2   09/22/22 1658 98.6 °F (37 °C) 88 20 (!) 148/100 97 %          Physical Exam  Vitals and nursing note reviewed. Exam conducted with a chaperone present. Constitutional:       General: He is not in acute distress. Appearance: Normal appearance. He is normal weight. He is not ill-appearing, toxic-appearing or diaphoretic. HENT:      Head: Normocephalic and atraumatic. Right Ear: Tympanic membrane, ear canal and external ear normal.      Left Ear: Tympanic membrane, ear canal and external ear normal.      Nose: Congestion and rhinorrhea present. Comments: Erythema and swelling as noted in picture. Purulent bilateral nasal drainage noted. Mouth/Throat:      Mouth: Mucous membranes are moist.   Eyes:      General: No scleral icterus. Right eye: No discharge. Left eye: No discharge. Conjunctiva/sclera: Conjunctivae normal.   Cardiovascular:      Rate and Rhythm: Normal rate and regular rhythm. Pulses: Normal pulses. Heart sounds: Normal heart sounds. Pulmonary:      Effort: Pulmonary effort is normal. No respiratory distress. Breath sounds: Normal breath sounds. No stridor. No wheezing, rhonchi or rales. Abdominal:      General: Abdomen is flat. Bowel sounds are normal.      Palpations: Abdomen is soft. Tenderness: There is no abdominal tenderness.  There is no guarding or rebound. Musculoskeletal:         General: Normal range of motion. Cervical back: Normal range of motion and neck supple. No rigidity or tenderness. Right lower leg: No edema. Left lower leg: No edema. Lymphadenopathy:      Cervical: No cervical adenopathy. Skin:     General: Skin is warm and dry. Capillary Refill: Capillary refill takes less than 2 seconds. Coloration: Skin is not jaundiced. Neurological:      General: No focal deficit present. Mental Status: He is alert and oriented to person, place, and time. Mental status is at baseline. Psychiatric:         Mood and Affect: Mood normal.         Behavior: Behavior normal.         Thought Content:  Thought content normal.         Judgment: Judgment normal.            Procedures    Results for orders placed or performed during the hospital encounter of 09/22/22   CBC with Auto Differential   Result Value Ref Range    WBC 18.9 (H) 4.3 - 11.1 K/uL    RBC 3.90 (L) 4.23 - 5.6 M/uL    Hemoglobin 12.2 (L) 13.6 - 17.2 g/dL    Hematocrit 35.4 (L) 41.1 - 50.3 %    MCV 90.8 79.6 - 97.8 FL    MCH 31.3 26.1 - 32.9 PG    MCHC 34.5 31.4 - 35.0 g/dL    RDW 13.2 11.9 - 14.6 %    Platelets 261 152 - 297 K/uL    MPV 8.8 (L) 9.4 - 12.3 FL    nRBC 0.00 0.0 - 0.2 K/uL    Differential Type AUTOMATED      Seg Neutrophils 84 (H) 43 - 78 %    Lymphocytes 8 (L) 13 - 44 %    Monocytes 7 4.0 - 12.0 %    Eosinophils % 0 (L) 0.5 - 7.8 %    Basophils 0 0.0 - 2.0 %    Immature Granulocytes 1 0.0 - 5.0 %    Segs Absolute 15.7 (H) 1.7 - 8.2 K/UL    Absolute Lymph # 1.6 0.5 - 4.6 K/UL    Absolute Mono # 1.4 (H) 0.1 - 1.3 K/UL    Absolute Eos # 0.1 0.0 - 0.8 K/UL    Basophils Absolute 0.0 0.0 - 0.2 K/UL    Absolute Immature Granulocyte 0.1 0.0 - 0.5 K/UL   Comprehensive Metabolic Panel   Result Value Ref Range    Sodium 134 (L) 138 - 145 mmol/L    Potassium 3.3 (L) 3.5 - 5.1 mmol/L    Chloride 100 (L) 101 - 110 mmol/L    CO2 26 21 - 32 mmol/L    Anion Gap 8 4 - 13 mmol/L    Glucose 132 (H) 65 - 100 mg/dL    BUN 10 6 - 23 MG/DL    Creatinine 0.60 (L) 0.8 - 1.5 MG/DL    GFR African American >60 >60 ml/min/1.73m2    GFR Non- >60 >60 ml/min/1.73m2    Calcium 9.7 8.3 - 10.4 MG/DL    Total Bilirubin 0.7 0.2 - 1.1 MG/DL    ALT 43 12 - 65 U/L    AST 49 (H) 15 - 37 U/L    Alk Phosphatase 134 50 - 136 U/L    Total Protein 8.2 6.3 - 8.2 g/dL    Albumin 3.7 3.5 - 5.0 g/dL    Globulin 4.5 (H) 2.3 - 3.5 g/dL    Albumin/Globulin Ratio 0.8 (L) 1.2 - 3.5     C-Reactive Protein   Result Value Ref Range    CRP 16.6 (H) 0.0 - 0.9 mg/dL   Sedimentation Rate   Result Value Ref Range    Sed Rate, Automated 88 (H) 0 - 20 mm/hr        CT MAXILLOFACIAL W CONTRAST    (Results Pending)                       Voice dictation software was used during the making of this note. This software is not perfect and grammatical and other typographical errors may be present. This note has not been completely proofread for errors.      Derrick Quigleyma  09/22/22 Jeb4

## 2022-09-22 NOTE — ED TRIAGE NOTES
Pt arrives from home via EMS stating nasal infection x 3 days. Pt states he was given abx for a \"nasal infection\". Pt states it was filled by his PCP. Pt unable to tell me who px the abx.  Pt nose is red swollen and draining a yellowish color substance    Vs with EMS  HR 94  /94  Tempt 99.4

## 2022-09-23 LAB
ALBUMIN SERPL-MCNC: 2.7 G/DL (ref 3.5–5)
ALBUMIN/GLOB SERPL: 0.7 {RATIO} (ref 1.2–3.5)
ALP SERPL-CCNC: 115 U/L (ref 50–136)
ALT SERPL-CCNC: 31 U/L (ref 12–65)
ANION GAP SERPL CALC-SCNC: 1 MMOL/L (ref 4–13)
AST SERPL-CCNC: 20 U/L (ref 15–37)
BASOPHILS # BLD: 0 K/UL (ref 0–0.2)
BASOPHILS NFR BLD: 0 % (ref 0–2)
BILIRUB SERPL-MCNC: 0.7 MG/DL (ref 0.2–1.1)
BUN SERPL-MCNC: 5 MG/DL (ref 6–23)
CALCIUM SERPL-MCNC: 8.3 MG/DL (ref 8.3–10.4)
CHLORIDE SERPL-SCNC: 107 MMOL/L (ref 101–110)
CO2 SERPL-SCNC: 28 MMOL/L (ref 21–32)
CREAT SERPL-MCNC: 0.6 MG/DL (ref 0.8–1.5)
DIFFERENTIAL METHOD BLD: ABNORMAL
EOSINOPHIL # BLD: 0 K/UL (ref 0–0.8)
EOSINOPHIL NFR BLD: 0 % (ref 0.5–7.8)
ERYTHROCYTE [DISTWIDTH] IN BLOOD BY AUTOMATED COUNT: 13.2 % (ref 11.9–14.6)
GLOBULIN SER CALC-MCNC: 3.9 G/DL (ref 2.3–3.5)
GLUCOSE SERPL-MCNC: 111 MG/DL (ref 65–100)
HCT VFR BLD AUTO: 30.9 % (ref 41.1–50.3)
HGB BLD-MCNC: 10.2 G/DL (ref 13.6–17.2)
IMM GRANULOCYTES # BLD AUTO: 0.1 K/UL (ref 0–0.5)
IMM GRANULOCYTES NFR BLD AUTO: 0 % (ref 0–5)
LYMPHOCYTES # BLD: 1.5 K/UL (ref 0.5–4.6)
LYMPHOCYTES NFR BLD: 10 % (ref 13–44)
MAGNESIUM SERPL-MCNC: 2.7 MG/DL (ref 1.8–2.4)
MCH RBC QN AUTO: 30.5 PG (ref 26.1–32.9)
MCHC RBC AUTO-ENTMCNC: 33 G/DL (ref 31.4–35)
MCV RBC AUTO: 92.5 FL (ref 79.6–97.8)
MONOCYTES # BLD: 1.1 K/UL (ref 0.1–1.3)
MONOCYTES NFR BLD: 8 % (ref 4–12)
NEUTS SEG # BLD: 12.4 K/UL (ref 1.7–8.2)
NEUTS SEG NFR BLD: 82 % (ref 43–78)
NRBC # BLD: 0 K/UL (ref 0–0.2)
PLATELET # BLD AUTO: 331 K/UL (ref 150–450)
PMV BLD AUTO: 8.9 FL (ref 9.4–12.3)
POTASSIUM SERPL-SCNC: 3.4 MMOL/L (ref 3.5–5.1)
PROT SERPL-MCNC: 6.6 G/DL (ref 6.3–8.2)
RBC # BLD AUTO: 3.34 M/UL (ref 4.23–5.6)
SODIUM SERPL-SCNC: 136 MMOL/L (ref 136–145)
WBC # BLD AUTO: 15.1 K/UL (ref 4.3–11.1)

## 2022-09-23 PROCEDURE — 2580000003 HC RX 258: Performed by: FAMILY MEDICINE

## 2022-09-23 PROCEDURE — 83735 ASSAY OF MAGNESIUM: CPT

## 2022-09-23 PROCEDURE — 36415 COLL VENOUS BLD VENIPUNCTURE: CPT

## 2022-09-23 PROCEDURE — 30000 DRAINAGE OF NOSE LESION: CPT | Performed by: STUDENT IN AN ORGANIZED HEALTH CARE EDUCATION/TRAINING PROGRAM

## 2022-09-23 PROCEDURE — 6370000000 HC RX 637 (ALT 250 FOR IP): Performed by: FAMILY MEDICINE

## 2022-09-23 PROCEDURE — G0378 HOSPITAL OBSERVATION PER HR: HCPCS

## 2022-09-23 PROCEDURE — 1100000000 HC RM PRIVATE

## 2022-09-23 PROCEDURE — 96367 TX/PROPH/DG ADDL SEQ IV INF: CPT

## 2022-09-23 PROCEDURE — 96366 THER/PROPH/DIAG IV INF ADDON: CPT

## 2022-09-23 PROCEDURE — 099M3ZZ DRAINAGE OF NASAL SEPTUM, PERCUTANEOUS APPROACH: ICD-10-PCS | Performed by: STUDENT IN AN ORGANIZED HEALTH CARE EDUCATION/TRAINING PROGRAM

## 2022-09-23 PROCEDURE — 2580000003 HC RX 258: Performed by: INTERNAL MEDICINE

## 2022-09-23 PROCEDURE — 6370000000 HC RX 637 (ALT 250 FOR IP): Performed by: STUDENT IN AN ORGANIZED HEALTH CARE EDUCATION/TRAINING PROGRAM

## 2022-09-23 PROCEDURE — 85025 COMPLETE CBC W/AUTO DIFF WBC: CPT

## 2022-09-23 PROCEDURE — 6360000002 HC RX W HCPCS: Performed by: INTERNAL MEDICINE

## 2022-09-23 PROCEDURE — 80053 COMPREHEN METABOLIC PANEL: CPT

## 2022-09-23 PROCEDURE — 6360000002 HC RX W HCPCS: Performed by: FAMILY MEDICINE

## 2022-09-23 RX ORDER — OXYCODONE HYDROCHLORIDE 15 MG/1
7.5 TABLET ORAL EVERY 4 HOURS PRN
Status: DISCONTINUED | OUTPATIENT
Start: 2022-09-23 | End: 2022-09-24 | Stop reason: HOSPADM

## 2022-09-23 RX ADMIN — MIRTAZAPINE 15 MG: 15 TABLET, FILM COATED ORAL at 20:27

## 2022-09-23 RX ADMIN — SALINE NASAL SPRAY 1 SPRAY: 1.5 SOLUTION NASAL at 07:47

## 2022-09-23 RX ADMIN — SALINE NASAL SPRAY 1 SPRAY: 1.5 SOLUTION NASAL at 11:23

## 2022-09-23 RX ADMIN — RISPERIDONE 0.5 MG: 0.5 TABLET ORAL at 21:25

## 2022-09-23 RX ADMIN — SODIUM CHLORIDE, PRESERVATIVE FREE 10 ML: 5 INJECTION INTRAVENOUS at 19:45

## 2022-09-23 RX ADMIN — FLUTICASONE PROPIONATE 2 SPRAY: 50 SPRAY, METERED NASAL at 17:45

## 2022-09-23 RX ADMIN — PRAZOSIN HYDROCHLORIDE 2 MG: 1 CAPSULE ORAL at 21:24

## 2022-09-23 RX ADMIN — SODIUM CHLORIDE 1500 MG: 9 INJECTION, SOLUTION INTRAVENOUS at 00:34

## 2022-09-23 RX ADMIN — BUPRENORPHINE AND NALOXONE 1 TABLET: 8; 2 TABLET SUBLINGUAL at 07:48

## 2022-09-23 RX ADMIN — SALINE NASAL SPRAY 1 SPRAY: 1.5 SOLUTION NASAL at 15:31

## 2022-09-23 RX ADMIN — FLUTICASONE PROPIONATE 2 SPRAY: 50 SPRAY, METERED NASAL at 22:39

## 2022-09-23 RX ADMIN — SODIUM CHLORIDE, PRESERVATIVE FREE 10 ML: 5 INJECTION INTRAVENOUS at 00:29

## 2022-09-23 RX ADMIN — AMPICILLIN SODIUM AND SULBACTAM SODIUM 3000 MG: 2; 1 INJECTION, POWDER, FOR SOLUTION INTRAMUSCULAR; INTRAVENOUS at 06:38

## 2022-09-23 RX ADMIN — AMPICILLIN SODIUM AND SULBACTAM SODIUM 3000 MG: 2; 1 INJECTION, POWDER, FOR SOLUTION INTRAMUSCULAR; INTRAVENOUS at 23:42

## 2022-09-23 RX ADMIN — SALINE NASAL SPRAY 1 SPRAY: 1.5 SOLUTION NASAL at 19:45

## 2022-09-23 RX ADMIN — POTASSIUM CHLORIDE 40 MEQ: 1500 TABLET, EXTENDED RELEASE ORAL at 00:22

## 2022-09-23 RX ADMIN — SODIUM CHLORIDE, SODIUM LACTATE, POTASSIUM CHLORIDE, AND CALCIUM CHLORIDE: 600; 310; 30; 20 INJECTION, SOLUTION INTRAVENOUS at 00:29

## 2022-09-23 RX ADMIN — CETIRIZINE HYDROCHLORIDE 10 MG: 10 TABLET ORAL at 07:47

## 2022-09-23 RX ADMIN — SALINE NASAL SPRAY 1 SPRAY: 1.5 SOLUTION NASAL at 00:25

## 2022-09-23 RX ADMIN — VANCOMYCIN HYDROCHLORIDE 750 MG: 750 INJECTION, POWDER, LYOPHILIZED, FOR SOLUTION INTRAVENOUS at 18:31

## 2022-09-23 RX ADMIN — ACETAMINOPHEN 650 MG: 325 TABLET, FILM COATED ORAL at 10:56

## 2022-09-23 RX ADMIN — FLUTICASONE PROPIONATE 2 SPRAY: 50 SPRAY, METERED NASAL at 13:25

## 2022-09-23 RX ADMIN — AMPICILLIN SODIUM AND SULBACTAM SODIUM 3000 MG: 2; 1 INJECTION, POWDER, FOR SOLUTION INTRAMUSCULAR; INTRAVENOUS at 00:39

## 2022-09-23 RX ADMIN — IBUPROFEN 800 MG: 800 TABLET, FILM COATED ORAL at 00:22

## 2022-09-23 RX ADMIN — CIPROFLOXACIN 500 MG: 500 TABLET, FILM COATED ORAL at 00:22

## 2022-09-23 RX ADMIN — AMPICILLIN SODIUM AND SULBACTAM SODIUM 3000 MG: 2; 1 INJECTION, POWDER, FOR SOLUTION INTRAMUSCULAR; INTRAVENOUS at 12:12

## 2022-09-23 RX ADMIN — AMPICILLIN SODIUM AND SULBACTAM SODIUM 3000 MG: 2; 1 INJECTION, POWDER, FOR SOLUTION INTRAMUSCULAR; INTRAVENOUS at 17:45

## 2022-09-23 RX ADMIN — SALINE NASAL SPRAY 1 SPRAY: 1.5 SOLUTION NASAL at 23:42

## 2022-09-23 RX ADMIN — POTASSIUM BICARBONATE 40 MEQ: 782 TABLET, EFFERVESCENT ORAL at 07:48

## 2022-09-23 RX ADMIN — FLUTICASONE PROPIONATE 2 SPRAY: 50 SPRAY, METERED NASAL at 07:47

## 2022-09-23 RX ADMIN — VANCOMYCIN HYDROCHLORIDE 750 MG: 750 INJECTION, POWDER, LYOPHILIZED, FOR SOLUTION INTRAVENOUS at 10:33

## 2022-09-23 RX ADMIN — MIRTAZAPINE 15 MG: 15 TABLET, FILM COATED ORAL at 00:22

## 2022-09-23 RX ADMIN — FLUTICASONE PROPIONATE 2 SPRAY: 50 SPRAY, METERED NASAL at 00:24

## 2022-09-23 RX ADMIN — RISPERIDONE 0.5 MG: 0.5 TABLET ORAL at 00:22

## 2022-09-23 RX ADMIN — OXYCODONE HYDROCHLORIDE 7.5 MG: 15 TABLET ORAL at 17:45

## 2022-09-23 RX ADMIN — SERTRALINE 100 MG: 100 TABLET, FILM COATED ORAL at 08:28

## 2022-09-23 ASSESSMENT — PAIN DESCRIPTION - ORIENTATION
ORIENTATION: RIGHT;LEFT
ORIENTATION: MID

## 2022-09-23 ASSESSMENT — PAIN DESCRIPTION - FREQUENCY: FREQUENCY: INTERMITTENT

## 2022-09-23 ASSESSMENT — ENCOUNTER SYMPTOMS
SINUS PAIN: 1
COLOR CHANGE: 0
CHOKING: 0
STRIDOR: 0
CONSTIPATION: 0
ABDOMINAL PAIN: 0
FACIAL SWELLING: 1
COUGH: 0
EYE DISCHARGE: 0
EYE REDNESS: 0
EYE PAIN: 0
ABDOMINAL DISTENTION: 0

## 2022-09-23 ASSESSMENT — PAIN SCALES - GENERAL
PAINLEVEL_OUTOF10: 0
PAINLEVEL_OUTOF10: 0
PAINLEVEL_OUTOF10: 3
PAINLEVEL_OUTOF10: 0
PAINLEVEL_OUTOF10: 0

## 2022-09-23 ASSESSMENT — PAIN DESCRIPTION - PAIN TYPE: TYPE: ACUTE PAIN

## 2022-09-23 ASSESSMENT — PAIN DESCRIPTION - LOCATION
LOCATION: HEAD
LOCATION: NOSE

## 2022-09-23 ASSESSMENT — PAIN DESCRIPTION - DESCRIPTORS
DESCRIPTORS: SORE
DESCRIPTORS: ACHING

## 2022-09-23 ASSESSMENT — PAIN DESCRIPTION - ONSET: ONSET: PROGRESSIVE

## 2022-09-23 ASSESSMENT — PAIN - FUNCTIONAL ASSESSMENT: PAIN_FUNCTIONAL_ASSESSMENT: ACTIVITIES ARE NOT PREVENTED

## 2022-09-23 NOTE — PROGRESS NOTES
VANCO DAILY FOLLOW UP NOTE  [address] [facility] Pharmacokinetic Monitoring Service - Vancomycin    Consulting Provider: Nish Murry   Indication: Head and neck infection  Target Concentration: Goal AUC/RAHAT 400-600 mg*hr/L  Day of Therapy: 1  Additional Antimicrobials: Unasyn    Pertinent Laboratory Values: Wt Readings from Last 1 Encounters:   09/22/22 140 lb (63.5 kg)     Temp Readings from Last 1 Encounters:   09/23/22 98.3 °F (36.8 °C) (Oral)     Recent Labs     09/22/22  1715 09/22/22  1850 09/23/22  0443   BUN 10  --  5*   CREATININE 0.60*  --  0.60*   WBC 18.9*  --  15.1*   LACACIDPL  --  0.7  --      Estimated Creatinine Clearance: 143 mL/min (A) (based on SCr of 0.6 mg/dL (L)). No results found for: Skip Daphney    MRSA Nasal Swab: was ordered by provider, awaiting results.       Assessment:  Date/Time Dose Concentration AUC         Note: Serum concentrations collected for AUC dosing may appear elevated if collected in close proximity to the dose administered, this is not necessarily an indication of toxicity    Plan:  Dosing recommendations based on Bayesian software  Start vancomycin 750 mg q 8h  Anticipated AUC of 437 and trough concentration of 13.4 at steady state  Renal labs as indicated   Vancomycin concentrations ordered for 9/24 @ 0600  Pharmacy will continue to monitor patient and adjust therapy as indicated    Thank you for the consult,  Alona Cevallos

## 2022-09-23 NOTE — PROGRESS NOTES
[facility] Office Note    Patient: Gene Hernandez  MRN: [id number]  : 1980  Gender:  male  Vital Signs: BP (!) 125/91   Pulse 89   Temp 98.5 °F (36.9 °C) (Oral)   Resp 18   Ht 5' 6\" (1.676 m)   Wt 140 lb (63.5 kg)   SpO2 96%   BMI 22.60 kg/m²   Date: 2022    CC:   Chief Complaint   Patient presents with    Wound Infection       HPI:  Gene Hernandez is a 36 y.o. male with a nasal abscess. I&D was performed yesterday. He has had recurrence of fluid in the nasal tip and septal space. He cannot breathe through his nose. Still with pain to his nose. He feels it is slightly better than yesterday. Past Medical History, Past Surgical History, Family history, Social History, and Medications were all reviewed with the patient today and updated as necessary.      Allergies   Allergen Reactions    Wasp Venom Protein Anaphylaxis    Nicotine Other (See Comments)     Nicotine patch caused nightmare     Patient Active Problem List   Diagnosis    Nonintractable epilepsy without status epilepticus ([address]    Anxiety    Seizure disorder (HCC)    Partial symptomatic epilepsy with complex partial seizures, not intractable, without status epilepticus ([address]    Severe episode of recurrent major depressive disorder, without psychotic features ([address]    Acute bacterial rhinosinusitis    Nasal abscess    Suppurative sinusitis with complications    Opioid use disorder, severe, in sustained remission, on maintenance therapy, dependence ([address]    Abscess, dental    Generalized anxiety disorder    Sacroiliitis (HCC)    Substance or medication-induced depressive disorder (HCC)    Insomnia due to other mental disorder    Hypokalemia    Hyponatremia     Current Facility-Administered Medications   Medication Dose Route Frequency    vancomycin (VANCOCIN) 750 mg in sodium chloride 0.9 % 250 mL IVPB ([id number])  750 mg IntraVENous Q8H    oxyCODONE (OXY-IR) immediate release tablet 7.5 mg  7.5 mg Oral Q4H PRN    sodium chloride flush 0.9 % injection 5-40 mL  5-40 mL IntraVENous 2 times per day    sodium chloride flush 0.9 % injection 5-40 mL  5-40 mL IntraVENous PRN    0.9 % sodium chloride infusion   IntraVENous PRN    [Held by provider] enoxaparin (LOVENOX) injection 40 mg  40 mg SubCUTAneous Daily    ondansetron (ZOFRAN-ODT) disintegrating tablet 4 mg  4 mg Oral Q8H PRN    Or    ondansetron (ZOFRAN) injection 4 mg  4 mg IntraVENous Q6H PRN    polyethylene glycol (GLYCOLAX) packet 17 g  17 g Oral Daily PRN    acetaminophen (TYLENOL) tablet 650 mg  650 mg Oral Q6H PRN    Or    acetaminophen (TYLENOL) suppository 650 mg  650 mg Rectal Q6H PRN    ampicillin-sulbactam (UNASYN) 3000 mg in 100 mL NS IVPB minibag  3,000 mg IntraVENous Q6H    cetirizine (ZYRTEC) tablet 10 mg  10 mg Oral Daily    buprenorphine-naloxone (SUBOXONE) 8-2 MG SL tablet 1 tablet  1 tablet SubLINGual Daily    prazosin (MINIPRESS) capsule 2 mg  2 mg Oral Nightly    risperiDONE (RISPERDAL) tablet 0.5 mg  0.5 mg Oral Nightly    mirtazapine (REMERON) tablet 15 mg  15 mg Oral Nightly    magnesium sulfate 2000 mg in 50 mL IVPB premix  2,000 mg IntraVENous PRN    potassium chloride (KLOR-CON M) extended release tablet 40 mEq  40 mEq Oral PRN    Or    potassium bicarb-citric acid (EFFER-K) effervescent tablet 40 mEq  40 mEq Oral PRN    Or    potassium chloride 10 mEq/100 mL IVPB (Peripheral Line)  10 mEq IntraVENous PRN    magic (miracle) mouthwash with nystatin  5 mL Swish & Spit 4x Daily PRN    sertraline (ZOLOFT) tablet 100 mg  100 mg Oral Daily    ibuprofen (ADVIL;MOTRIN) tablet 400 mg  400 mg Oral Q6H PRN    fluticasone (FLONASE) 50 MCG/ACT nasal spray 2 spray  2 spray Each Nostril 4x daily    sodium chloride (OCEAN, BABY AYR) 0.65 % nasal spray 1 spray  1 spray Each Nostril Q4H    nicotine (NICODERM CQ) 14 MG/24HR 1 patch  1 patch TransDERmal Daily PRN     Facility-Administered Medications Ordered in Other Encounters   Medication Dose Route Frequency    0.9 % sodium chloride bolus  100 mL IntraVENous ONCE PRN     Past Medical History:   Diagnosis Date    Anxiety     Chronic pain     left shoulder    Closed nondisplaced fracture of greater tuberosity of left humerus 7/13/2016    Last Assessment & Plan:  Formatting of this note is different from the original. He does have a follow-up appointment with pain management in 2 weeks. I will prescribe diclofenac 75 mg twice a day. 60×0. I did advise him not to take any over-the-counter anti-inflammatory medicine such as ibuprofen, Advil, Motrin, or Aleve. Ill-defined condition     broken back    Opioid use disorder, severe, in sustained remission, on maintenance therapy, dependence ([address] 9/22/2022    Other ill-defined conditions(799.89)     F4,5, & 7 compression fx    Seizures ([address]     states \"seizures are anxiety driven\". last seizure was 10/2016    Severe episode of recurrent major depressive disorder, without psychotic features ([address] 9/19/2016    Suicidal ideations 12/14/2018     Social History     Tobacco Use    Smoking status: Light Smoker     Packs/day: 0.25     Types: Cigarettes    Smokeless tobacco: Never   Substance Use Topics    Alcohol use: No     Past Surgical History:   Procedure Laterality Date    ORTHOPEDIC SURGERY  2016    LT ankle    ORTHOPEDIC SURGERY  2017    LT shoulder     Family History   Problem Relation Age of Onset    Rheum Arthritis Mother     COPD Father     Anxiety Disorder Father     Anxiety Disorder Sister     Depression Sister     Bipolar Disorder Sister     Schizophrenia Brother         ROS:    Review of Systems   Constitutional:  Negative for activity change, chills and fever. HENT:  Positive for congestion, facial swelling, nosebleeds and sinus pain. Negative for ear pain and tinnitus. Eyes:  Negative for pain, discharge and redness. Respiratory:  Negative for cough, choking and stridor. Cardiovascular:  Negative for chest pain, palpitations and leg swelling.    Gastrointestinal:  Negative for abdominal distention, abdominal pain and constipation. Endocrine: Negative for cold intolerance, polydipsia and polyuria. Genitourinary:  Negative for difficulty urinating, dysuria and frequency. Musculoskeletal:  Negative for neck pain and neck stiffness. Skin:  Negative for color change. Allergic/Immunologic: Negative for environmental allergies, food allergies and immunocompromised state. Neurological:  Negative for dizziness, facial asymmetry and numbness. Hematological:  Negative for adenopathy. Does not bruise/bleed easily. Psychiatric/Behavioral:  Negative for agitation, behavioral problems and decreased concentration. PHYSICAL EXAM:    BP (!) 125/91   Pulse 89   Temp 98.5 °F (36.9 °C) (Oral)   Resp 18   Ht 5' 6\" (1.676 m)   Wt 140 lb (63.5 kg)   SpO2 96%   BMI 22.60 kg/m²     General: NAD, well-appearing  Neuro: No gross neuro deficits. CN's II-XII intact. No facial weakness. Eyes: EOMI. Pupils reactive. No periorbital edema/ecchymosis. Skin: No facial erythema, rashes or concerning lesions. Nose: significant swelling and erythema to nasal tip and septum causing obstruction of the nasal passages bilaterally. Mouth: Moist mucus membranes, normal tongue/palate mobility, no concerning mucosal lesions. Oropharynx: clear with no erythema/exudate, no tonsillar hypertrophy. Ears: Normal appearing auricles, no hematomas. EACs clear with no cerumen impaction, healthy canal skin, TM's intact with no perforations or retraction pockets. No middle ear effusions. Neck: Soft, supple, no palpable lateral neck masses. No parotid or submandibular masses. No thyromegaly or palpable thyroid nodules. No surgical scars. Lymphatics: No palpable cervical LAD. Resp/Lungs: No audible stridor or wheezing, CTAB  Heart: RRR  Extremities: No clubbing or cyanosis. Procedure:  Incision and drainage of nasal abscess involving septum  Indications: Nasal abscess  Description: Informed consent was obtained. The nose was cleansed with Betadine. 1% lidocaine with 100,000 epinephrine was injected. A scalpel was used to make an incision along the caudal septum bilaterally. The scalpel was also used to make a columellar incision. There was expression of abundant purulence throughout all sites. The abscess was then further opened using blunt spreads with forceps. Again there was release of more purulence. The nose and septum was irrigated with saline flushes with an Angiocath tip. A Hubbard tip suction was then inserted into the I&D site and further purulence was suctioned out. Patient tolerated the procedure well. ASSESSMENT and PLAN    42yM w/ nasal abscess s/p I&D x 2     -Continue IV abx  -f/u Cx  -flonase QID  -Nasal saline sprays         Natali Billings MD  9/23/2022  Electronically signed    Note dictated using voice recognition software. Please excuse any typos.

## 2022-09-23 NOTE — PROGRESS NOTES
[facility] Pharmacokinetic Monitoring Service - Vancomycin     Horacio Jarrett is a 36 y.o. male starting on vancomycin therapy for nasal abscess and dental abscess. Pharmacy consulted by Dr. Jewel Molina for monitoring and adjustment. Target Concentration: Goal AUC/RAHAT 400-600 mg*hr/L    Additional Antimicrobials: Unasyn    Pertinent Laboratory Values: Wt Readings from Last 1 Encounters:   09/22/22 140 lb (63.5 kg)     Temp Readings from Last 1 Encounters:   09/22/22 99 °F (37.2 °C) (Oral)     Recent Labs     09/22/22  1715 09/22/22  1850   BUN 10  --    CREATININE 0.60*  --    WBC 18.9*  --    LACACIDPL  --  0.7     Estimated Creatinine Clearance: 143 mL/min (A) (based on SCr of 0.6 mg/dL (L)). No results found for: Miracle Kulkarni    MRSA Nasal Swab: was ordered by provider, awaiting results. .    Plan:  Dosing recommendations based on Bayesian software  Start vancomycin 1500mg IV x 1 followed by Vancomycin 1000mg IV q12h  Anticipated AUC of 576 and trough concentration of 17.9 at steady state  Renal labs as indicated   Vancomycin concentration ordered for 9/24 @ 0600    Pharmacy will continue to monitor patient and adjust therapy as indicated    Thank you for the consult,  Veronica Wesley, [facility]

## 2022-09-23 NOTE — H&P
Hospitalist History and Physical   Admit Date:  2022  8:08 PM   Name:  Genevieve Choi   Age:  36 y.o. Sex:  male  :  1980   MRN:  [id number]   Room:  [facility]    Presenting Complaint: Wound Infection     Reason(s) for Admission: Nasal abscess [J34.0]  Suppurative sinusitis with complications [J19.4]     History of Present Illness:   Genevieve Choi is a 36 y.o. male with medical history of MDD, pseudoseizures, OUD on suboxone, VANDANA who presented with co nasal infection that started 3 days ago with nose red, painful, hot and swollen with purulent drainage. Saw an abscess but couldn't reach it to kaushal it. Has been taking ibuprofen about three times per day and nasal saline spray with minimal relief. Got azithromycin RX prescribed from his suboxone doctor but had no transport to pharmacy. Reports his mother suffers from alcohol use disorder and stopped drinking about 2 years ago and now on restoril at bedtime with worsening mood, behavior. He had to take an ambulance to get here for treatment. Labs: WBC 18.9K Sna 134 K 3.3   1. Large fluid collection with enhancing rim involving the distal aspect of the   nasal passages out into the soft tissues of the nose. Abscess or resolving   hematoma or possible. Involvement of the cartilaginous structures is possible. Recommend ENT consultation. 2. No evidence of facial fracture. Mucosal thickening maxillary and ethmoid   sinuses. In ED, vss. Rec'd 1 L NS, K-effer, zofran and IV clinda 900     History of stimulant use d/o, substance induced psychosis, cannabis use d/o and benzodiazepine use disorder per psychiatry ED note 2021. UDS +meth at that time. Review of Systems:  Review of Systems   Constitutional:  Positive for fever. Negative for activity change and appetite change. HENT:  Positive for congestion, dental problem, rhinorrhea, sinus pressure and sinus pain. Eyes:  Negative for photophobia and visual disturbance.    Respiratory: Negative for cough and shortness of breath. Cardiovascular:  Negative for chest pain and leg swelling. Gastrointestinal:  Negative for abdominal distention, abdominal pain and constipation. Endocrine: Negative for polydipsia, polyphagia and polyuria. Genitourinary:  Negative for decreased urine volume and frequency. Musculoskeletal:  Positive for arthralgias and myalgias. Skin:  Positive for rash. Allergic/Immunologic: Negative for immunocompromised state. Neurological:  Positive for headaches. Negative for tremors, weakness and numbness. Hematological:  Bruises/bleeds easily. Psychiatric/Behavioral:  Positive for dysphoric mood. Negative for behavioral problems. Assessment & Plan:     Complicated acute rhino sinusitis with nasal abscess and dental abscess - s/p clindamycin and nasal I&D by ENT in ED. Aspirate collected for CX. Check MSRA PCR. Start unasyn/vanc. Add cipro for cartilage involvement per ENT. Ocean spray, antihistamine, flonase. Gentle IVFx24 hours. Dental referral on DC. Severe MDD // history of trauma // probable PTSD // VANDANA  - zoloft, risperidal, remeron, prazosin qhs     OUD, on MAT, sustained remission - resume suboxone. History of stimulant use d/o and benzodiazapene use d/o -noted. Hyponatremia - IVF.    Hypokalemia - replete, check mag in AM     Dental carries - dental referral on DC     Anticipated discharge needs:     N/a     Diet: ADULT DIET; Easy to Chew  VTE ppx: scd  Code status: Full Code    Hospital Problems:  Principal Problem:    Suppurative sinusitis with complications  Active Problems:    Acute bacterial rhinosinusitis    Abscess of nasal cavity    Opioid use disorder, severe, in sustained remission, on maintenance therapy, dependence (HCC)    Abscess, dental    Generalized anxiety disorder    Insomnia due to other mental disorder    Hypokalemia    Hyponatremia    Severe episode of recurrent major depressive disorder, without psychotic features [facility])  Resolved Problems:    * No resolved hospital problems. *       Past History:     Past Medical History:   Diagnosis Date    Anxiety     Chronic pain     left shoulder    Closed nondisplaced fracture of greater tuberosity of left humerus 7/13/2016    Last Assessment & Plan:  Formatting of this note is different from the original. He does have a follow-up appointment with pain management in 2 weeks. I will prescribe diclofenac 75 mg twice a day. 60×0. I did advise him not to take any over-the-counter anti-inflammatory medicine such as ibuprofen, Advil, Motrin, or Aleve. Ill-defined condition     broken back    Opioid use disorder, severe, in sustained remission, on maintenance therapy, dependence ([address] 9/22/2022    Other ill-defined conditions(799.89)     F4,5, & 7 compression fx    Seizures ([address]     states \"seizures are anxiety driven\". last seizure was 10/2016    Severe episode of recurrent major depressive disorder, without psychotic features ([address] 9/19/2016    Suicidal ideations 12/14/2018       Past Surgical History:   Procedure Laterality Date    ORTHOPEDIC SURGERY  2016    LT ankle    ORTHOPEDIC SURGERY  2017    LT shoulder        Social History     Tobacco Use    Smoking status: Light Smoker     Packs/day: 0.25     Types: Cigarettes    Smokeless tobacco: Never   Substance Use Topics    Alcohol use: No      Social History     Substance and Sexual Activity   Drug Use No       Family History   Problem Relation Age of Onset    Rheum Arthritis Mother     COPD Father     Anxiety Disorder Father     Anxiety Disorder Sister     Depression Sister     Bipolar Disorder Sister     Schizophrenia Brother           There is no immunization history on file for this patient.   Allergies   Allergen Reactions    Wasp Venom Protein Anaphylaxis    Nicotine Other (See Comments)     Nicotine patch caused nightmare     Prior to Admit Medications:  Current Outpatient Medications   Medication Instructions    baclofen (LIORESAL) 10 mg, Oral, 3 TIMES DAILY    clonazePAM (KLONOPIN) 1 mg, Oral, 2 TIMES DAILY    DULoxetine (CYMBALTA) 30 mg, Oral, DAILY    EPINEPHrine (EPIPEN JR 2-URSZULA) 0.15 MG/0.3ML SOAJ INJECT 0.3ML INTO THE MUSCLE ONCE AS NEEDED FOR ANAPHYLAXIS    ibuprofen (ADVIL;MOTRIN) 800 mg, Oral, EVERY 8 HOURS PRN    lamoTRIgine (LAMICTAL) 200 mg, Oral, 2 TIMES DAILY    mirtazapine (REMERON) 15 mg, Oral    prazosin (MINIPRESS) 2 mg, Oral    risperiDONE (RISPERDAL) 0.5 mg, Oral    sertraline (ZOLOFT) 50 mg, Oral, DAILY    triamcinolone (ARISTOCORT) 0.5 % ointment Topical, 2 TIMES DAILY         Objective:   Patient Vitals for the past 24 hrs:   Temp Pulse Resp BP SpO2   09/22/22 2305 -- 98 18 126/70 97 %   09/22/22 2112 -- -- -- (!) 144/101 96 %   09/22/22 2109 -- -- -- (!) 145/108 99 %   09/22/22 2036 -- 86 -- 124/87 97 %   09/22/22 1658 98.6 °F (37 °C) 88 20 (!) 148/100 97 %       Oxygen Therapy  SpO2: 97 %  O2 Device: None (Room air)    Estimated body mass index is 22.6 kg/m² as calculated from the following:    Height as of this encounter: 5' 6\" (1.676 m). Weight as of this encounter: 140 lb (63.5 kg). No intake or output data in the 24 hours ending 09/22/22 2313      Physical Exam:    Blood pressure 126/70, pulse 98, temperature 98.6 °F (37 °C), temperature source Oral, resp. rate 18, height 5' 6\" (1.676 m), weight 140 lb (63.5 kg), SpO2 97 %. General:    Well nourished. Head:  Normocephalic, atraumatic  Eyes:  Sclerae appear normal.  Pupils equally round. ENT:  Edematous, erythemous nasal tip and septum. Poor dentition. Neck:  No restricted ROM. Trachea midline   CV:   RRR. No m/r/g. No jugular venous distension. Lungs:   CTAB. No wheezing, rhonchi, or rales. Symmetric expansion. Abdomen: Bowel sounds present. Soft, nontender, nondistended. Extremities: No cyanosis or clubbing. No edema  Skin:     No rashes and normal coloration. Warm and dry. Neuro:  CN II-XII grossly intact.   Sensation intact. A&Ox3  Psych:  Normal mood and affect.       I have personally reviewed labs and tests showing:  Recent Labs:  Recent Results (from the past 24 hour(s))   CBC with Auto Differential    Collection Time: 09/22/22  5:15 PM   Result Value Ref Range    WBC 18.9 (H) 4.3 - 11.1 K/uL    RBC 3.90 (L) 4.23 - 5.6 M/uL    Hemoglobin 12.2 (L) 13.6 - 17.2 g/dL    Hematocrit 35.4 (L) 41.1 - 50.3 %    MCV 90.8 79.6 - 97.8 FL    MCH 31.3 26.1 - 32.9 PG    MCHC 34.5 31.4 - 35.0 g/dL    RDW 13.2 11.9 - 14.6 %    Platelets 781 737 - 539 K/uL    MPV 8.8 (L) 9.4 - 12.3 FL    nRBC 0.00 0.0 - 0.2 K/uL    Differential Type AUTOMATED      Seg Neutrophils 84 (H) 43 - 78 %    Lymphocytes 8 (L) 13 - 44 %    Monocytes 7 4.0 - 12.0 %    Eosinophils % 0 (L) 0.5 - 7.8 %    Basophils 0 0.0 - 2.0 %    Immature Granulocytes 1 0.0 - 5.0 %    Segs Absolute 15.7 (H) 1.7 - 8.2 K/UL    Absolute Lymph # 1.6 0.5 - 4.6 K/UL    Absolute Mono # 1.4 (H) 0.1 - 1.3 K/UL    Absolute Eos # 0.1 0.0 - 0.8 K/UL    Basophils Absolute 0.0 0.0 - 0.2 K/UL    Absolute Immature Granulocyte 0.1 0.0 - 0.5 K/UL   Comprehensive Metabolic Panel    Collection Time: 09/22/22  5:15 PM   Result Value Ref Range    Sodium 134 (L) 138 - 145 mmol/L    Potassium 3.3 (L) 3.5 - 5.1 mmol/L    Chloride 100 (L) 101 - 110 mmol/L    CO2 26 21 - 32 mmol/L    Anion Gap 8 4 - 13 mmol/L    Glucose 132 (H) 65 - 100 mg/dL    BUN 10 6 - 23 MG/DL    Creatinine 0.60 (L) 0.8 - 1.5 MG/DL    GFR African American >60 >60 ml/min/1.73m2    GFR Non- >60 >60 ml/min/1.73m2    Calcium 9.7 8.3 - 10.4 MG/DL    Total Bilirubin 0.7 0.2 - 1.1 MG/DL    ALT 43 12 - 65 U/L    AST 49 (H) 15 - 37 U/L    Alk Phosphatase 134 50 - 136 U/L    Total Protein 8.2 6.3 - 8.2 g/dL    Albumin 3.7 3.5 - 5.0 g/dL    Globulin 4.5 (H) 2.3 - 3.5 g/dL    Albumin/Globulin Ratio 0.8 (L) 1.2 - 3.5     C-Reactive Protein    Collection Time: 09/22/22  5:15 PM   Result Value Ref Range    CRP 16.6 (H) 0.0 - 0.9 mg/dL Sedimentation Rate    Collection Time: 09/22/22  5:15 PM   Result Value Ref Range    Sed Rate, Automated 88 (H) 0 - 20 mm/hr   Lactic Acid    Collection Time: 09/22/22  6:50 PM   Result Value Ref Range    Lactic Acid, Plasma 0.7 0.4 - 2.0 MMOL/L       I have personally reviewed imaging studies showing:  CT MAXILLOFACIAL W CONTRAST    Result Date: 9/22/2022  CT MAXILLOFACIAL W CONTRAST 9/22/2022 8:16 PM HISTORY: 3 days of nasal infection. COMPARISON: None. TECHNIQUE: Thin section axial imaging is performed through the maxillofacial bones with intravenous contrast. Sagittal and coronal reformatted images also generated. 100 mL Isovue-370. Radiation dose reduction techniques were used for this study: All CT scans performed at this facility use one or all of the following: Automated exposure control, adjustment of the mA and/or kVp according to patient's size, iterative reconstruction. FINDINGS:  CT through the facial bones demonstrates no fracture or dislocation. Mandible is intact and the mandibular condyles are in their normal position. Visible portions upper cervical spine are normal without fracture or malalignment. Paranasal sinuses demonstrate a coastal thickening involving the maxillary sinuses and ethmoid sinuses bilaterally. Frontal sinuses and mastoid air cells are clear. There is a large fluid collection with enhancing rim involving the anterior aspect of the nasal septum extending out into the soft tissues and cartilaginous regions of the nose. Findings are concerning for a large abscess collection or possibly large resolving hematoma. This measures 5.9 cm AP, 2.7 cm transverse and 4.2 cm craniocaudal dimension. Involvement of the cartilaginous structures is possible. No other fluid collections are appreciated. No there are incisors with periapical lucencies possibly representing dental abscesses potentially a source for this suspected abscess.  Enlarged bilateral cervical and submandibular lymph nodes are present. Orbits are intact and the extraocular muscles are unremarkable. Limited evaluation of the intracranial structures appear normal.     1. Large fluid collection with enhancing rim involving the distal aspect of the nasal passages out into the soft tissues of the nose. Abscess or resolving hematoma or possible. Involvement of the cartilaginous structures is possible. Recommend ENT consultation. 2. No evidence of facial fracture. Mucosal thickening maxillary and ethmoid sinuses. Echocardiogram:  No results found for this or any previous visit.         Orders Placed This Encounter   Medications    clindamycin (CLEOCIN) 900 mg in dextrose 5 % 50 mL IVPB     Order Specific Question:   Antimicrobial Indications     Answer:   Skin and Soft Tissue Infection    0.9 % sodium chloride bolus    DISCONTD: potassium bicarb-citric acid (EFFER-K) effervescent tablet 20 mEq    ondansetron (ZOFRAN-ODT) disintegrating tablet 4 mg    buprenorphine-naloxone (SUBOXONE) 8-2 MG SL tablet 1 tablet    sodium chloride flush 0.9 % injection 5-40 mL    sodium chloride flush 0.9 % injection 5-40 mL    0.9 % sodium chloride infusion    enoxaparin (LOVENOX) injection 40 mg     Order Specific Question:   Indication of Use     Answer:   Prophylaxis-DVT/PE    OR Linked Order Group     ondansetron (ZOFRAN-ODT) disintegrating tablet 4 mg     ondansetron (ZOFRAN) injection 4 mg    polyethylene glycol (GLYCOLAX) packet 17 g    OR Linked Order Group     acetaminophen (TYLENOL) tablet 650 mg     acetaminophen (TYLENOL) suppository 650 mg    ampicillin-sulbactam (UNASYN) 3000 mg in 100 mL NS IVPB minibag     Order Specific Question:   Antimicrobial Indications     Answer:   Head and Neck Infection    lactated ringers infusion    DISCONTD: sodium chloride (OCEAN, BABY AYR) 0.65 % nasal spray 2 spray    DISCONTD: fluticasone (FLONASE) 50 MCG/ACT nasal spray 1 spray    cetirizine (ZYRTEC) tablet 10 mg    buprenorphine-naloxone (SUBOXONE) 8-2 MG SL tablet 1 tablet    prazosin (MINIPRESS) capsule 2 mg    risperiDONE (RISPERDAL) tablet 0.5 mg    DISCONTD: sertraline (ZOLOFT) tablet 50 mg    mirtazapine (REMERON) tablet 15 mg    ibuprofen (ADVIL;MOTRIN) tablet 800 mg    potassium chloride (KLOR-CON M) extended release tablet 40 mEq    magnesium sulfate 2000 mg in 50 mL IVPB premix    OR Linked Order Group     potassium chloride (KLOR-CON M) extended release tablet 40 mEq     potassium bicarb-citric acid (EFFER-K) effervescent tablet 40 mEq     potassium chloride 10 mEq/100 mL IVPB (Peripheral Line)    magic (miracle) mouthwash with nystatin    ciprofloxacin (CIPRO) tablet 500 mg     Order Specific Question:   Antimicrobial Indications     Answer:   Head and Neck Infection    sertraline (ZOLOFT) tablet 100 mg    ibuprofen (ADVIL;MOTRIN) tablet 400 mg    fluticasone (FLONASE) 50 MCG/ACT nasal spray 2 spray    sodium chloride (OCEAN, BABY AYR) 0.65 % nasal spray 1 spray    nicotine (NICODERM CQ) 14 MG/24HR 1 patch         Signed:  Nish Murry DO, DO    Part of this note may have been written by using a voice dictation software. The note has been proof read but may still contain some grammatical/other typographical errors.

## 2022-09-23 NOTE — PROGRESS NOTES
Hospitalist Progress Note   Admit Date:  2022  8:08 PM   Name:  Jeffrey Pate   Age:  36 y.o. Sex:  male  :  1980   MRN:  [id number]   Room:  [place] [address]    Presenting Complaint: Wound Infection     Reason(s) for Admission: Nasal abscess [J34.0]  Suppurative sinusitis with complications [N47.1]     Hospital Course:   Jeffrey Pate is a 36 y.o. male with medical history of MDD, pseudoseizures, OUD on suboxone, VANDANA who presented with co nasal infection of 3 days duration with nose red, painful, hot and swollen with purulent drainage. Saw an abscess but couldn't reach it to kaushal it. Got azithromycin RX prescribed from his suboxone doctor but had no transport to pharmacy to get it. Came to ER for eval.    Admitted with acute suppurative rhinosinusitis with abscess. ENT saw in ER, s/p I&D. Was started on vanc/unasyn. Subjective & 24hr Events (22): Pt feeling better today though still with significant nasal drainage sometimes bloody. Nasal swelling and erythema has improved per patient but still severe. No fevers. Pain controlled      Assessment & Plan:       Suppurative acute bacterial rhinosinusitis with abscess  -cont vanc/unasyn. WBC improved. Can probably go home with augmentin in 1-2d but will wait on cultures first to see if any MRSA isolated (less common cause of ARS per UTD)      Opioid use disorder, severe, in sustained remission, on maintenance therapy, dependence (HCC)  -cont suboxone      Abscess, dental  -needs outpatient dental care      Hypokalemia  -PRNs ordered. Daily BMP      Hyponatremia  -resolved      Generalized anxiety disorder    Severe episode of recurrent major depressive disorder  Cont zoloft, risperdal      Insomnia due to other mental disorder  -cont home sleep meds      Anticipated discharge needs:    none    Diet:  ADULT DIET; Easy to Chew  DVT PPx: ambulating frequently in ER.   Nosebleed at times so no AC  Code status: Full Code    LOGAN VARGAS normal coloration. Warm and dry. Neuro:  CN II-XII grossly intact. Sensation intact. A&Ox3  Psych:  Normal mood and affect.       I have personally reviewed labs and tests showing:  Recent Labs:  Recent Results (from the past 48 hour(s))   CBC with Auto Differential    Collection Time: 09/22/22  5:15 PM   Result Value Ref Range    WBC 18.9 (H) 4.3 - 11.1 K/uL    RBC 3.90 (L) 4.23 - 5.6 M/uL    Hemoglobin 12.2 (L) 13.6 - 17.2 g/dL    Hematocrit 35.4 (L) 41.1 - 50.3 %    MCV 90.8 79.6 - 97.8 FL    MCH 31.3 26.1 - 32.9 PG    MCHC 34.5 31.4 - 35.0 g/dL    RDW 13.2 11.9 - 14.6 %    Platelets 008 538 - 634 K/uL    MPV 8.8 (L) 9.4 - 12.3 FL    nRBC 0.00 0.0 - 0.2 K/uL    Differential Type AUTOMATED      Seg Neutrophils 84 (H) 43 - 78 %    Lymphocytes 8 (L) 13 - 44 %    Monocytes 7 4.0 - 12.0 %    Eosinophils % 0 (L) 0.5 - 7.8 %    Basophils 0 0.0 - 2.0 %    Immature Granulocytes 1 0.0 - 5.0 %    Segs Absolute 15.7 (H) 1.7 - 8.2 K/UL    Absolute Lymph # 1.6 0.5 - 4.6 K/UL    Absolute Mono # 1.4 (H) 0.1 - 1.3 K/UL    Absolute Eos # 0.1 0.0 - 0.8 K/UL    Basophils Absolute 0.0 0.0 - 0.2 K/UL    Absolute Immature Granulocyte 0.1 0.0 - 0.5 K/UL   Comprehensive Metabolic Panel    Collection Time: 09/22/22  5:15 PM   Result Value Ref Range    Sodium 134 (L) 138 - 145 mmol/L    Potassium 3.3 (L) 3.5 - 5.1 mmol/L    Chloride 100 (L) 101 - 110 mmol/L    CO2 26 21 - 32 mmol/L    Anion Gap 8 4 - 13 mmol/L    Glucose 132 (H) 65 - 100 mg/dL    BUN 10 6 - 23 MG/DL    Creatinine 0.60 (L) 0.8 - 1.5 MG/DL    GFR African American >60 >60 ml/min/1.73m2    GFR Non- >60 >60 ml/min/1.73m2    Calcium 9.7 8.3 - 10.4 MG/DL    Total Bilirubin 0.7 0.2 - 1.1 MG/DL    ALT 43 12 - 65 U/L    AST 49 (H) 15 - 37 U/L    Alk Phosphatase 134 50 - 136 U/L    Total Protein 8.2 6.3 - 8.2 g/dL    Albumin 3.7 3.5 - 5.0 g/dL    Globulin 4.5 (H) 2.3 - 3.5 g/dL    Albumin/Globulin Ratio 0.8 (L) 1.2 - 3.5     C-Reactive Protein    Collection Time: 09/22/22  5:15 PM   Result Value Ref Range    CRP 16.6 (H) 0.0 - 0.9 mg/dL   Sedimentation Rate    Collection Time: 09/22/22  5:15 PM   Result Value Ref Range    Sed Rate, Automated 88 (H) 0 - 20 mm/hr   Culture, Blood 1    Collection Time: 09/22/22  6:50 PM    Specimen: Blood   Result Value Ref Range    Special Requests NO SPECIAL REQUESTS  RIGHT  HAND        Culture NO GROWTH AFTER 11 HOURS     Lactic Acid    Collection Time: 09/22/22  6:50 PM   Result Value Ref Range    Lactic Acid, Plasma 0.7 0.4 - 2.0 MMOL/L   Culture, Nasal    Collection Time: 09/22/22 11:13 PM    Specimen: Nose   Result Value Ref Range    Special Requests NO SPECIAL REQUESTS      Culture        No growth after short period of incubation. Further results to follow after overnight incubation.    CBC with Auto Differential    Collection Time: 09/23/22  4:43 AM   Result Value Ref Range    WBC 15.1 (H) 4.3 - 11.1 K/uL    RBC 3.34 (L) 4.23 - 5.6 M/uL    Hemoglobin 10.2 (L) 13.6 - 17.2 g/dL    Hematocrit 30.9 (L) 41.1 - 50.3 %    MCV 92.5 79.6 - 97.8 FL    MCH 30.5 26.1 - 32.9 PG    MCHC 33.0 31.4 - 35.0 g/dL    RDW 13.2 11.9 - 14.6 %    Platelets 722 511 - 365 K/uL    MPV 8.9 (L) 9.4 - 12.3 FL    nRBC 0.00 0.0 - 0.2 K/uL    Differential Type AUTOMATED      Seg Neutrophils 82 (H) 43 - 78 %    Lymphocytes 10 (L) 13 - 44 %    Monocytes 8 4.0 - 12.0 %    Eosinophils % 0 (L) 0.5 - 7.8 %    Basophils 0 0.0 - 2.0 %    Immature Granulocytes 0 0.0 - 5.0 %    Segs Absolute 12.4 (H) 1.7 - 8.2 K/UL    Absolute Lymph # 1.5 0.5 - 4.6 K/UL    Absolute Mono # 1.1 0.1 - 1.3 K/UL    Absolute Eos # 0.0 0.0 - 0.8 K/UL    Basophils Absolute 0.0 0.0 - 0.2 K/UL    Absolute Immature Granulocyte 0.1 0.0 - 0.5 K/UL   Magnesium    Collection Time: 09/23/22  4:43 AM   Result Value Ref Range    Magnesium 2.7 (H) 1.8 - 2.4 mg/dL   Comprehensive Metabolic Panel    Collection Time: 09/23/22  4:43 AM   Result Value Ref Range    Sodium 136 136 - 145 mmol/L    Potassium 3.4 (L) ampicillin-sulbactam (UNASYN) 3000 mg in 100 mL NS IVPB minibag  3,000 mg IntraVENous Q6H    lactated ringers infusion   IntraVENous Continuous    cetirizine (ZYRTEC) tablet 10 mg  10 mg Oral Daily    buprenorphine-naloxone (SUBOXONE) 8-2 MG SL tablet 1 tablet  1 tablet SubLINGual Daily    prazosin (MINIPRESS) capsule 2 mg  2 mg Oral Nightly    risperiDONE (RISPERDAL) tablet 0.5 mg  0.5 mg Oral Nightly    mirtazapine (REMERON) tablet 15 mg  15 mg Oral Nightly    magnesium sulfate 2000 mg in 50 mL IVPB premix  2,000 mg IntraVENous PRN    potassium chloride (KLOR-CON M) extended release tablet 40 mEq  40 mEq Oral PRN    Or    potassium bicarb-citric acid (EFFER-K) effervescent tablet 40 mEq  40 mEq Oral PRN    Or    potassium chloride 10 mEq/100 mL IVPB (Peripheral Line)  10 mEq IntraVENous PRN    magic (miracle) mouthwash with nystatin  5 mL Swish & Spit 4x Daily PRN    sertraline (ZOLOFT) tablet 100 mg  100 mg Oral Daily    ibuprofen (ADVIL;MOTRIN) tablet 400 mg  400 mg Oral Q6H PRN    fluticasone (FLONASE) 50 MCG/ACT nasal spray 2 spray  2 spray Each Nostril 4x daily    sodium chloride (OCEAN, BABY AYR) 0.65 % nasal spray 1 spray  1 spray Each Nostril Q4H    nicotine (NICODERM CQ) 14 MG/24HR 1 patch  1 patch TransDERmal Daily PRN     Facility-Administered Medications Ordered in Other Encounters   Medication Dose Route Frequency    0.9 % sodium chloride bolus  100 mL IntraVENous ONCE PRN       Signed:  Hali Rodriguez MD    Part of this note may have been written by using a voice dictation software. The note has been proof read but may still contain some grammatical/other typographical errors.

## 2022-09-23 NOTE — PROGRESS NOTES
TRANSFER - IN REPORT:    Verbal report received from [facility]  on Clement Spore being received from ED for routine progression of care. Report consisted of patients Situation, Background, Assessment and Recommendations(SBAR). Information from the following report(s) SBAR, ED Summary, and Procedure Summary was reviewed. Opportunity for questions and clarification was provided. Assessment completed upon patients arrival to unit and care assumed. Patient received to room OFL 21. Patient connected to monitor and assessment completed. Plan of care reviewed. Patient oriented to room and call light. Patient aware to use call light to communicate any chest pain or needs. Admission skin assessment completed with second RN and reveals the following: Scattered bruising, heels and sacrum intact. Nares swollen, reddened pt s/p nasal ID with ENT.      With Kathrine Ivan

## 2022-09-23 NOTE — PROGRESS NOTES
Reviewed notes for new spiritual concerns. MOM - JOHNATHON    NASAL    OPIOID USER    VANDANA    RECURRENT MAJOR DEPRESSION    LIVES LOCALLY    HX ETOH ABUSE - STOPPED DRINKING X 2 YRS AGO    MOOD CHANGES                Will follow as needed.

## 2022-09-23 NOTE — CONSULTS
chloride infusion   IntraVENous PRN    [START ON 9/23/2022] enoxaparin (LOVENOX) injection 40 mg  40 mg SubCUTAneous Daily    ondansetron (ZOFRAN-ODT) disintegrating tablet 4 mg  4 mg Oral Q8H PRN    Or    ondansetron (ZOFRAN) injection 4 mg  4 mg IntraVENous Q6H PRN    polyethylene glycol (GLYCOLAX) packet 17 g  17 g Oral Daily PRN    acetaminophen (TYLENOL) tablet 650 mg  650 mg Oral Q6H PRN    Or    acetaminophen (TYLENOL) suppository 650 mg  650 mg Rectal Q6H PRN    ampicillin-sulbactam (UNASYN) 3000 mg in 100 mL NS IVPB minibag  3,000 mg IntraVENous Q6H    lactated ringers infusion   IntraVENous Continuous    sodium chloride (OCEAN, BABY AYR) 0.65 % nasal spray 2 spray  2 spray Each Nostril 4x daily    [START ON 9/23/2022] fluticasone (FLONASE) 50 MCG/ACT nasal spray 1 spray  1 spray Each Nostril Daily    [START ON 9/23/2022] cetirizine (ZYRTEC) tablet 10 mg  10 mg Oral Daily    [START ON 9/23/2022] buprenorphine-naloxone (SUBOXONE) 8-2 MG SL tablet 1 tablet  1 tablet SubLINGual Daily    prazosin (MINIPRESS) capsule 2 mg  2 mg Oral Nightly    risperiDONE (RISPERDAL) tablet 0.5 mg  0.5 mg Oral Nightly    mirtazapine (REMERON) tablet 15 mg  15 mg Oral Nightly    ibuprofen (ADVIL;MOTRIN) tablet 800 mg  800 mg Oral NOW    potassium chloride (KLOR-CON M) extended release tablet 40 mEq  40 mEq Oral Once    magnesium sulfate 2000 mg in 50 mL IVPB premix  2,000 mg IntraVENous PRN    potassium chloride (KLOR-CON M) extended release tablet 40 mEq  40 mEq Oral PRN    Or    potassium bicarb-citric acid (EFFER-K) effervescent tablet 40 mEq  40 mEq Oral PRN    Or    potassium chloride 10 mEq/100 mL IVPB (Peripheral Line)  10 mEq IntraVENous PRN    magic (miracle) mouthwash with nystatin  5 mL Swish & Spit 4x Daily PRN    ciprofloxacin (CIPRO) tablet 500 mg  500 mg Oral 2 times per day    [START ON 9/23/2022] sertraline (ZOLOFT) tablet 100 mg  100 mg Oral Daily    ibuprofen (ADVIL;MOTRIN) tablet 400 mg  400 mg Oral Q6H PRN Current Outpatient Medications   Medication Sig    baclofen (LIORESAL) 10 MG tablet Take 10 mg by mouth 3 times daily    clonazePAM (KLONOPIN) 1 MG tablet Take 1 mg by mouth 2 times daily. DULoxetine (CYMBALTA) 30 MG extended release capsule Take 30 mg by mouth daily    EPINEPHrine (EPIPEN JR 2-URSZULA) 0.15 MG/0.3ML SOAJ INJECT 0.3ML INTO THE MUSCLE ONCE AS NEEDED FOR ANAPHYLAXIS    ibuprofen (ADVIL;MOTRIN) 800 MG tablet Take 800 mg by mouth every 8 hours as needed    lamoTRIgine (LAMICTAL) 200 MG tablet Take 200 mg by mouth 2 times daily    mirtazapine (REMERON) 15 MG tablet Take 15 mg by mouth    prazosin (MINIPRESS) 2 MG capsule Take 2 mg by mouth    risperiDONE (RISPERDAL) 0.5 MG tablet Take 0.5 mg by mouth    sertraline (ZOLOFT) 50 MG tablet Take 50 mg by mouth daily    triamcinolone (ARISTOCORT) 0.5 % ointment Apply topically 2 times daily     Facility-Administered Medications Ordered in Other Encounters   Medication Dose Route Frequency    0.9 % sodium chloride bolus  100 mL IntraVENous ONCE PRN    sodium chloride flush 0.9 % injection 10 mL  10 mL IntraVENous ONCE PRN     Past Medical History:   Diagnosis Date    Anxiety     Chronic pain     left shoulder    Closed nondisplaced fracture of greater tuberosity of left humerus 7/13/2016    Last Assessment & Plan:  Formatting of this note is different from the original. He does have a follow-up appointment with pain management in 2 weeks. I will prescribe diclofenac 75 mg twice a day. 60×0. I did advise him not to take any over-the-counter anti-inflammatory medicine such as ibuprofen, Advil, Motrin, or Aleve. Ill-defined condition     broken back    Opioid use disorder, severe, in sustained remission, on maintenance therapy, dependence ([address] 9/22/2022    Other ill-defined conditions(799.89)     F4,5, & 7 compression fx    Seizures ([facility] [address]     states \"seizures are anxiety driven\".  last seizure was 10/2016    Severe episode of recurrent major depressive disorder, without psychotic features ([facility] 9/19/2016    Suicidal ideations 12/14/2018     Social History     Tobacco Use    Smoking status: Light Smoker     Packs/day: 0.25     Types: Cigarettes    Smokeless tobacco: Never   Substance Use Topics    Alcohol use: No     Past Surgical History:   Procedure Laterality Date    ORTHOPEDIC SURGERY  2016    LT ankle    ORTHOPEDIC SURGERY  2017    LT shoulder     Family History   Problem Relation Age of Onset    Rheum Arthritis Mother     COPD Father     Anxiety Disorder Father     Anxiety Disorder Sister     Depression Sister     Bipolar Disorder Sister     Schizophrenia Brother         ROS:    Review of Systems   Constitutional:  Negative for activity change, chills and fever. HENT:  Positive for congestion, facial swelling, rhinorrhea and sinus pressure. Negative for ear pain, nosebleeds, sinus pain and tinnitus. Eyes:  Negative for pain, discharge and redness. Respiratory:  Negative for cough, choking and stridor. Cardiovascular:  Negative for chest pain, palpitations and leg swelling. Gastrointestinal:  Negative for abdominal distention, abdominal pain and constipation. Endocrine: Negative for cold intolerance, polydipsia and polyuria. Genitourinary:  Negative for difficulty urinating, dysuria and frequency. Musculoskeletal:  Negative for neck pain and neck stiffness. Skin:  Negative for color change. Allergic/Immunologic: Negative for environmental allergies, food allergies and immunocompromised state. Neurological:  Negative for dizziness, facial asymmetry and numbness. Hematological:  Negative for adenopathy. Does not bruise/bleed easily. Psychiatric/Behavioral:  Negative for agitation, behavioral problems and decreased concentration. PHYSICAL EXAM:    /87   Pulse 86   Temp 98.6 °F (37 °C) (Oral)   Resp 20   Ht 5' 6\" (1.676 m)   Wt 140 lb (63.5 kg)   SpO2 97%   BMI 22.60 kg/m²     General: NAD, well-appearing  Neuro:  No gross neuro deficits. CN's II-XII intact. No facial weakness. Eyes: EOMI. Pupils reactive. No periorbital edema/ecchymosis. Skin: No facial erythema, rashes or concerning lesions. Nose: significant swelling and erythema to nasal tip and septum causing obstruction of the nasal passages bilaterally. Mouth: Moist mucus membranes, normal tongue/palate mobility, no concerning mucosal lesions. Oropharynx: clear with no erythema/exudate, no tonsillar hypertrophy. Ears: Normal appearing auricles, no hematomas. EACs clear with no cerumen impaction, healthy canal skin, TM's intact with no perforations or retraction pockets. No middle ear effusions. Neck: Soft, supple, no palpable lateral neck masses. No parotid or submandibular masses. No thyromegaly or palpable thyroid nodules. No surgical scars. Lymphatics: No palpable cervical LAD. Resp/Lungs: No audible stridor or wheezing, CTAB  Heart: RRR  Extremities: No clubbing or cyanosis. Procedure: Incision and drainage of nasal abscess involving septum  Indications: Nasal abscess  Description: Informed consent was obtained. The nose was cleansed with alcohol swabs. 1% lidocaine with 100,000 epinephrine was injected. A scalpel was used to make an incision along the caudal septum bilaterally. There was expression of abundant purulence. This was cultured. The abscess was then further opened using blunt spreads with forceps. Again there was release of more purulence. A Hubbard tip suction was then inserted into the I&D site and further purulence was suctioned out. Patient tolerated the procedure well.     Lab Results   Component Value Date    WBC 18.9 (H) 09/22/2022    HGB 12.2 (L) 09/22/2022    HCT 35.4 (L) 09/22/2022    MCV 90.8 09/22/2022     09/22/2022     Lab Results   Component Value Date/Time     09/22/2022 05:15 PM    K 3.3 09/22/2022 05:15 PM     09/22/2022 05:15 PM    CO2 26 09/22/2022 05:15 PM    BUN 10 09/22/2022 05:15 PM CREATININE 0.60 09/22/2022 05:15 PM    GLUCOSE 132 09/22/2022 05:15 PM    CALCIUM 9.7 09/22/2022 05:15 PM      CT Result (most recent):  CT MAXILLOFACIAL W CONTRAST 09/22/2022    Narrative  CT MAXILLOFACIAL W CONTRAST 9/22/2022 8:16 PM    HISTORY: 3 days of nasal infection. COMPARISON: None. TECHNIQUE: Thin section axial imaging is performed through the maxillofacial  bones with intravenous contrast. Sagittal and coronal reformatted images also  generated. 100 mL Isovue-370. Radiation dose reduction techniques were used for this study: All CT scans  performed at this facility use one or all of the following: Automated exposure  control, adjustment of the mA and/or kVp according to patient's size, iterative  reconstruction. FINDINGS:  CT through the facial bones demonstrates no fracture or dislocation. Mandible is intact and the mandibular condyles are in their normal position. Visible portions upper cervical spine are normal without fracture or  malalignment. Paranasal sinuses demonstrate a coastal thickening involving the  maxillary sinuses and ethmoid sinuses bilaterally. Frontal sinuses and mastoid  air cells are clear. There is a large fluid collection with enhancing rim  involving the anterior aspect of the nasal septum extending out into the soft  tissues and cartilaginous regions of the nose. Findings are concerning for a  large abscess collection or possibly large resolving hematoma. This measures 5.9  cm AP, 2.7 cm transverse and 4.2 cm craniocaudal dimension. Involvement of the  cartilaginous structures is possible. No other fluid collections are  appreciated. No there are incisors with periapical lucencies possibly  representing dental abscesses potentially a source for this suspected abscess. Enlarged bilateral cervical and submandibular lymph nodes are present. Orbits  are intact and the extraocular muscles are unremarkable.  Limited evaluation of  the intracranial structures appear normal.    Impression  1. Large fluid collection with enhancing rim involving the distal aspect of the  nasal passages out into the soft tissues of the nose. Abscess or resolving  hematoma or possible. Involvement of the cartilaginous structures is possible. Recommend ENT consultation. 2. No evidence of facial fracture. Mucosal thickening maxillary and ethmoid  sinuses. ASSESSMENT and PLAN    42yM w/ nasal abscess s/p I&D    -Medicine admit for IV abx  -f/u Cx  -flonase BID  -Nasal saline sprays      Manny Daugherty MD  9/22/2022  Electronically signed    Note dictated using voice recognition software. Please excuse any typos.

## 2022-09-23 NOTE — ED NOTES
TRANSFER - OUT REPORT:    Verbal report given to [facility] RN on Glenn Skinner  being transferred to  [facility] for routine progression of patient care       Report consisted of patient's Situation, Background, Assessment and   Recommendations(SBAR). Information from the following report(s) ED Encounter Summary, ED SBAR and [facility] was reviewed with the receiving nurse. Lines:   Peripheral IV Left Antecubital (Active)       Peripheral IV 09/22/22 Right Hand (Active)   Site Assessment Clean, dry & intact 09/22/22 2047   Line Status Brisk blood return 09/22/22 2047   Phlebitis Assessment No symptoms 09/22/22 2047   Infiltration Assessment 0 09/22/22 2047        Opportunity for questions and clarification was provided.       Patient transported with:  Registered Nurse     Federico Baca RN  09/22/22 1291

## 2022-09-24 VITALS
RESPIRATION RATE: 18 BRPM | WEIGHT: 140 LBS | SYSTOLIC BLOOD PRESSURE: 124 MMHG | HEIGHT: 66 IN | HEART RATE: 97 BPM | BODY MASS INDEX: 22.5 KG/M2 | TEMPERATURE: 98.2 F | DIASTOLIC BLOOD PRESSURE: 88 MMHG | OXYGEN SATURATION: 96 %

## 2022-09-24 LAB
ALBUMIN SERPL-MCNC: 2.7 G/DL (ref 3.5–5)
ALBUMIN/GLOB SERPL: 0.8 {RATIO} (ref 1.2–3.5)
ALP SERPL-CCNC: 119 U/L (ref 50–136)
ALT SERPL-CCNC: 33 U/L (ref 12–65)
ANION GAP SERPL CALC-SCNC: 8 MMOL/L (ref 4–13)
AST SERPL-CCNC: 15 U/L (ref 15–37)
BACTERIA SPEC CULT: ABNORMAL
BACTERIA SPEC CULT: ABNORMAL
BASOPHILS # BLD: 0 K/UL (ref 0–0.2)
BASOPHILS NFR BLD: 0 % (ref 0–2)
BILIRUB SERPL-MCNC: 0.4 MG/DL (ref 0.2–1.1)
BUN SERPL-MCNC: 5 MG/DL (ref 6–23)
CALCIUM SERPL-MCNC: 8.7 MG/DL (ref 8.3–10.4)
CHLORIDE SERPL-SCNC: 105 MMOL/L (ref 101–110)
CO2 SERPL-SCNC: 29 MMOL/L (ref 21–32)
CREAT SERPL-MCNC: 0.5 MG/DL (ref 0.8–1.5)
DIFFERENTIAL METHOD BLD: ABNORMAL
EOSINOPHIL # BLD: 0.1 K/UL (ref 0–0.8)
EOSINOPHIL NFR BLD: 1 % (ref 0.5–7.8)
ERYTHROCYTE [DISTWIDTH] IN BLOOD BY AUTOMATED COUNT: 13.6 % (ref 11.9–14.6)
GLOBULIN SER CALC-MCNC: 3.2 G/DL (ref 2.3–3.5)
GLUCOSE SERPL-MCNC: 110 MG/DL (ref 65–100)
HCT VFR BLD AUTO: 31.4 % (ref 41.1–50.3)
HGB BLD-MCNC: 10.4 G/DL (ref 13.6–17.2)
IMM GRANULOCYTES # BLD AUTO: 0 K/UL (ref 0–0.5)
IMM GRANULOCYTES NFR BLD AUTO: 0 % (ref 0–5)
LYMPHOCYTES # BLD: 2.1 K/UL (ref 0.5–4.6)
LYMPHOCYTES NFR BLD: 21 % (ref 13–44)
MAGNESIUM SERPL-MCNC: 2.3 MG/DL (ref 1.8–2.4)
MCH RBC QN AUTO: 31.1 PG (ref 26.1–32.9)
MCHC RBC AUTO-ENTMCNC: 33.1 G/DL (ref 31.4–35)
MCV RBC AUTO: 94 FL (ref 79.6–97.8)
MONOCYTES # BLD: 0.7 K/UL (ref 0.1–1.3)
MONOCYTES NFR BLD: 7 % (ref 4–12)
NEUTS SEG # BLD: 7.2 K/UL (ref 1.7–8.2)
NEUTS SEG NFR BLD: 71 % (ref 43–78)
NRBC # BLD: 0 K/UL (ref 0–0.2)
PLATELET # BLD AUTO: 381 K/UL (ref 150–450)
PMV BLD AUTO: 8.9 FL (ref 9.4–12.3)
POTASSIUM SERPL-SCNC: 3.7 MMOL/L (ref 3.5–5.1)
PROT SERPL-MCNC: 5.9 G/DL (ref 6.3–8.2)
RBC # BLD AUTO: 3.34 M/UL (ref 4.23–5.6)
SERVICE CMNT-IMP: ABNORMAL
SODIUM SERPL-SCNC: 142 MMOL/L (ref 138–145)
VANCOMYCIN SERPL-MCNC: 12.3 UG/ML
WBC # BLD AUTO: 10 K/UL (ref 4.3–11.1)

## 2022-09-24 PROCEDURE — 6370000000 HC RX 637 (ALT 250 FOR IP): Performed by: INTERNAL MEDICINE

## 2022-09-24 PROCEDURE — 80202 ASSAY OF VANCOMYCIN: CPT

## 2022-09-24 PROCEDURE — 85025 COMPLETE CBC W/AUTO DIFF WBC: CPT

## 2022-09-24 PROCEDURE — 6370000000 HC RX 637 (ALT 250 FOR IP): Performed by: FAMILY MEDICINE

## 2022-09-24 PROCEDURE — 6360000002 HC RX W HCPCS: Performed by: INTERNAL MEDICINE

## 2022-09-24 PROCEDURE — 87641 MR-STAPH DNA AMP PROBE: CPT

## 2022-09-24 PROCEDURE — 6360000002 HC RX W HCPCS: Performed by: FAMILY MEDICINE

## 2022-09-24 PROCEDURE — 2580000003 HC RX 258: Performed by: INTERNAL MEDICINE

## 2022-09-24 PROCEDURE — 96366 THER/PROPH/DIAG IV INF ADDON: CPT

## 2022-09-24 PROCEDURE — G0378 HOSPITAL OBSERVATION PER HR: HCPCS

## 2022-09-24 PROCEDURE — 2580000003 HC RX 258: Performed by: FAMILY MEDICINE

## 2022-09-24 PROCEDURE — 36415 COLL VENOUS BLD VENIPUNCTURE: CPT

## 2022-09-24 PROCEDURE — 80053 COMPREHEN METABOLIC PANEL: CPT

## 2022-09-24 PROCEDURE — 83735 ASSAY OF MAGNESIUM: CPT

## 2022-09-24 RX ORDER — AMOXICILLIN AND CLAVULANATE POTASSIUM 875; 125 MG/1; MG/1
1 TABLET, FILM COATED ORAL EVERY 12 HOURS SCHEDULED
Qty: 16 TABLET | Refills: 0 | Status: SHIPPED | OUTPATIENT
Start: 2022-09-24 | End: 2022-10-02

## 2022-09-24 RX ORDER — BUPRENORPHINE AND NALOXONE 2; .5 MG/1; MG/1
1 FILM, SOLUBLE BUCCAL; SUBLINGUAL DAILY
Status: ON HOLD | COMMUNITY
End: 2022-09-24

## 2022-09-24 RX ORDER — AMOXICILLIN AND CLAVULANATE POTASSIUM 875; 125 MG/1; MG/1
1 TABLET, FILM COATED ORAL EVERY 12 HOURS SCHEDULED
Status: DISCONTINUED | OUTPATIENT
Start: 2022-09-24 | End: 2022-09-24 | Stop reason: HOSPADM

## 2022-09-24 RX ORDER — BUPRENORPHINE AND NALOXONE 8; 2 MG/1; MG/1
1 FILM, SOLUBLE BUCCAL; SUBLINGUAL DAILY
COMMUNITY

## 2022-09-24 RX ORDER — FLUTICASONE PROPIONATE 50 MCG
1 SPRAY, SUSPENSION (ML) NASAL 3 TIMES DAILY
Qty: 1 EACH | Refills: 0 | Status: SHIPPED | OUTPATIENT
Start: 2022-09-24 | End: 2022-10-01

## 2022-09-24 RX ORDER — AMOXICILLIN AND CLAVULANATE POTASSIUM 875; 125 MG/1; MG/1
1 TABLET, FILM COATED ORAL EVERY 12 HOURS SCHEDULED
Qty: 16 TABLET | Refills: 0 | OUTPATIENT
Start: 2022-09-24 | End: 2022-09-24 | Stop reason: SDUPTHER

## 2022-09-24 RX ORDER — FLUTICASONE PROPIONATE 50 MCG
1 SPRAY, SUSPENSION (ML) NASAL 3 TIMES DAILY
Qty: 1 EACH | Refills: 0 | OUTPATIENT
Start: 2022-09-24 | End: 2022-09-24 | Stop reason: SDUPTHER

## 2022-09-24 RX ORDER — DOXYCYCLINE HYCLATE 100 MG/1
100 CAPSULE ORAL EVERY 12 HOURS SCHEDULED
Qty: 16 CAPSULE | Refills: 0 | OUTPATIENT
Start: 2022-09-24 | End: 2022-09-24 | Stop reason: SDUPTHER

## 2022-09-24 RX ORDER — DOXYCYCLINE HYCLATE 100 MG/1
100 CAPSULE ORAL EVERY 12 HOURS SCHEDULED
Status: DISCONTINUED | OUTPATIENT
Start: 2022-09-24 | End: 2022-09-24 | Stop reason: HOSPADM

## 2022-09-24 RX ORDER — DOXYCYCLINE HYCLATE 100 MG/1
100 CAPSULE ORAL EVERY 12 HOURS SCHEDULED
Qty: 16 CAPSULE | Refills: 0 | Status: SHIPPED | OUTPATIENT
Start: 2022-09-24 | End: 2022-10-02

## 2022-09-24 RX ADMIN — DOXYCYCLINE HYCLATE 100 MG: 100 CAPSULE ORAL at 08:19

## 2022-09-24 RX ADMIN — FLUTICASONE PROPIONATE 2 SPRAY: 50 SPRAY, METERED NASAL at 08:16

## 2022-09-24 RX ADMIN — SALINE NASAL SPRAY 1 SPRAY: 1.5 SOLUTION NASAL at 08:15

## 2022-09-24 RX ADMIN — AMPICILLIN SODIUM AND SULBACTAM SODIUM 3000 MG: 2; 1 INJECTION, POWDER, FOR SOLUTION INTRAMUSCULAR; INTRAVENOUS at 06:15

## 2022-09-24 RX ADMIN — BUPRENORPHINE AND NALOXONE 1 TABLET: 8; 2 TABLET SUBLINGUAL at 08:15

## 2022-09-24 RX ADMIN — VANCOMYCIN HYDROCHLORIDE 750 MG: 750 INJECTION, POWDER, LYOPHILIZED, FOR SOLUTION INTRAVENOUS at 02:23

## 2022-09-24 RX ADMIN — SALINE NASAL SPRAY 1 SPRAY: 1.5 SOLUTION NASAL at 03:36

## 2022-09-24 RX ADMIN — SERTRALINE 100 MG: 100 TABLET, FILM COATED ORAL at 08:15

## 2022-09-24 RX ADMIN — SALINE NASAL SPRAY 1 SPRAY: 1.5 SOLUTION NASAL at 11:00

## 2022-09-24 RX ADMIN — FLUTICASONE PROPIONATE 2 SPRAY: 50 SPRAY, METERED NASAL at 14:20

## 2022-09-24 RX ADMIN — CETIRIZINE HYDROCHLORIDE 10 MG: 10 TABLET ORAL at 08:15

## 2022-09-24 ASSESSMENT — PAIN SCALES - GENERAL: PAINLEVEL_OUTOF10: 0

## 2022-09-24 NOTE — CARE COORDINATION
Patient will be d/c home today. Patient has no needs identified at being d/c home today. Patient requested transport home. CM arrange roundtrip. Patient roundtrip is scheduled for 3:45 pm.    Patient has met all treatment goals / milestones. CM will continue to monitor and remain available for any needs that may occur. 09/24/22 3929   Service Assessment   Patient Orientation Alert and Oriented;Person;Place;Situation;Self   Cognition Alert   History Provided By Patient;Medical Record   Primary Caregiver Self   Prior Functional Level Independent in ADLs/IADLs   Current Functional Level Independent in ADLs/IADLs   Can patient return to prior living arrangement Yes   Ability to make needs known: Good   Family able to assist with home care needs: Yes   Would you like for me to discuss the discharge plan with any other family members/significant others, and if so, who? Yes   Social/Functional History   ADL Assistance Independent   Homemaking Assistance Independent   Ambulation Assistance Independent   Transfer Assistance Independent   Active  Yes   Discharge Planning   Patient expects to be discharged to: [facility]   [address] Provided?  No

## 2022-09-24 NOTE — DISCHARGE SUMMARY
Hospitalist Discharge Summary   Admit Date:  2022  8:08 PM   DC Note date: 2022  Name:  Jeffrey Pate   Age:  36 y.o. Sex:  male  :  1980   MRN:  [id number]   Room:  [place] [address]  PCP:  LEVON Sam NP    Presenting Complaint: Wound Infection     Initial Admission Diagnosis: Nasal abscess [J34.0]  Suppurative sinusitis with complications [L66.0]     Problem List for this Hospitalization (present on admission):    Principal Problem:    Suppurative sinusitis with complications  Active Problems:    Acute bacterial rhinosinusitis    Nasal abscess    Opioid use disorder, severe, in sustained remission, on maintenance therapy, dependence ([address]    Abscess, dental    Generalized anxiety disorder    Insomnia due to other mental disorder    Hypokalemia    Hyponatremia    Severe episode of recurrent major depressive disorder, without psychotic features ([facility]  Resolved Problems:    * No resolved hospital problems. *      Hospital Course:  Jeffrey Pate is a 36 y.o. male with medical history of MDD, pseudoseizures, OUD on suboxone, VANDANA who presented with co nasal infection of 3 days duration with nose red, painful, hot and swollen with purulent drainage. Saw an abscess but couldn't reach it to kaushal it. Got azithromycin RX prescribed from his suboxone doctor but had no transport to pharmacy to get it. Came to ER for eval.     Admitted with acute suppurative rhinosinusitis with abscess. ENT saw in ER, s/p I&D. Was started on vanc/unasyn. he has improved greatly since admit though still has significant swelling. Will need to continue oral abx for another 8 days. MRSA swab is pending and there are GPC in I&D culture; doesn't indicate whether staph or strep yet. So will cover with doxy/augmentin both. He can follow up with ENT if there are any concerns about his progress. Addendum:  MRSA swab positive.   Does not indicate MRSA caused; cultures still pending, but continue both abx.    Disposition: home  Diet: ADULT DIET; Easy to Chew  Code Status: Full Code    Follow Ups:   Follow-up Information     Dino Osgood, MD. Call. Specialty: Otolaryngology  Why: As needed, If symptoms worsen  Contact information:  [address]  [phone]                       Time spent in patient discharge and coordination 35 minutes. Follow up labs/diagnostics (ultimately defer to outpatient provider):  CBC, BMP    Plan was discussed with pt. All questions answered. Patient was stable at time of discharge. Instructions given to call a physician or return if any concerns. Current Discharge Medication List        START taking these medications    Details   amoxicillin-clavulanate (AUGMENTIN) 875-125 MG per tablet Take 1 tablet by mouth every 12 hours for 8 days  Qty: 16 tablet, Refills: 0      doxycycline hyclate (VIBRAMYCIN) 100 MG capsule Take 1 capsule by mouth every 12 hours for 8 days  Qty: 16 capsule, Refills: 0      fluticasone (FLONASE) 50 MCG/ACT nasal spray 1 spray by Each Nostril route in the morning, at noon, and at bedtime for 7 days  Qty: 1 each, Refills: 0      sodium chloride (OCEAN, BABY AYR) 0.65 % nasal spray 1 spray by Nasal route as needed for Congestion  Qty: 1 each, Refills: 0           CONTINUE these medications which have NOT CHANGED    Details   buprenorphine-naloxone (SUBOXONE) 8-2 MG FILM SL film Place 1 Film under the tongue daily.       DULoxetine (CYMBALTA) 30 MG extended release capsule Take 30 mg by mouth daily      EPINEPHrine (EPIPEN JR 2-URSZULA) 0.15 MG/0.3ML SOAJ INJECT 0.3ML INTO THE MUSCLE ONCE AS NEEDED FOR ANAPHYLAXIS      ibuprofen (ADVIL;MOTRIN) 800 MG tablet Take 800 mg by mouth every 8 hours as needed      mirtazapine (REMERON) 15 MG tablet Take 15 mg by mouth      sertraline (ZOLOFT) 50 MG tablet Take 50 mg by mouth daily           STOP taking these medications       buprenorphine-naloxone (SUBOXONE) 2-0.5 MG FILM SL film Comments:   Reason for Stopping:         baclofen (LIORESAL) 10 MG tablet Comments:   Reason for Stopping:         clonazePAM (KLONOPIN) 1 MG tablet Comments:   Reason for Stopping:         lamoTRIgine (LAMICTAL) 200 MG tablet Comments:   Reason for Stopping:         prazosin (MINIPRESS) 2 MG capsule Comments:   Reason for Stopping:         risperiDONE (RISPERDAL) 0.5 MG tablet Comments:   Reason for Stopping:         triamcinolone (ARISTOCORT) 0.5 % ointment Comments:   Reason for Stopping:               Procedures done this admission:  * No surgery found *    Consults this admission:  None    Echocardiogram results:  No results found for this or any previous visit. Diagnostic Imaging/Tests:   CT MAXILLOFACIAL W CONTRAST    Result Date: 9/22/2022  1. Large fluid collection with enhancing rim involving the distal aspect of the nasal passages out into the soft tissues of the nose. Abscess or resolving hematoma or possible. Involvement of the cartilaginous structures is possible. Recommend ENT consultation. 2. No evidence of facial fracture. Mucosal thickening maxillary and ethmoid sinuses.         Labs: Results:       BMP, Mg, Phos Recent Labs     09/22/22 1715 09/23/22 0443 09/24/22  0626   * 136 142   K 3.3* 3.4* 3.7   * 107 105   CO2 26 28 29   ANIONGAP 8 1* 8   BUN 10 5* 5*   CREATININE 0.60* 0.60* 0.50*   LABGLOM >60 >60 >60   GFRAA >60 >60 >60   CALCIUM 9.7 8.3 8.7   GLUCOSE 132* 111* 110*   MG  --  2.7* 2.3      CBC Recent Labs     09/22/22 1715 09/23/22 0443 09/24/22  0626   WBC 18.9* 15.1* 10.0   RBC 3.90* 3.34* 3.34*   HGB 12.2* 10.2* 10.4*   HCT 35.4* 30.9* 31.4*   MCV 90.8 92.5 94.0   MCH 31.3 30.5 31.1   MCHC 34.5 33.0 33.1   RDW 13.2 13.2 13.6    331 381   MPV 8.8* 8.9* 8.9*   NRBC 0.00 0.00 0.00   SEGS 84* 82* 71   LYMPHOPCT 8* 10* 21   EOSRELPCT 0* 0* 1   MONOPCT 7 8 7   BASOPCT 0 0 0   IMMGRAN 1 0 0   SEGSABS 15.7* 12.4* 7.2   LYMPHSABS 1.6 1.5 2.1   EOSABS 0.1 0.0 0. 1   MONOSABS 1.4* 1.1 0.7   BASOSABS 0.0 0.0 0.0   ABSIMMGRAN 0.1 0.1 0.0      LFT Recent Labs     09/22/22  1715 09/23/22  0443 09/24/22  0626   BILITOT 0.7 0.7 0.4   ALKPHOS 134 115 119   AST 49* 20 15   ALT 43 31 33   PROT 8.2 6.6 5.9*   LABALBU 3.7 2.7* 2.7*   GLOB 4.5* 3.9* 3.2      Cardiac  No results found for: NTPROBNP, TROPHS   Coags No results found for: PROTIME, INR, APTT   A1c No results found for: LABA1C, EAG   Lipids No results found for: CHOL, LDLCALC, LABVLDL, HDL, CHOLHDLRATIO, TRIG   Thyroid  No results found for: Carvel Go     Most Recent UA No results found for: COLORU, APPEARANCE, SPECGRAV, LABPH, PROTEINU, GLUCOSEU, KETUA, BILIRUBINUR, BLOODU, UROBILINOGEN, NITRU, LEUKOCYTESUR, WBCUA, RBCUA, EPITHUA, BACTERIA, LABCAST, MUCUS     Recent Labs     09/22/22  2313 09/22/22  1850   CULTURE MODERATE GRAM POSITIVE COCCI SUBCULTURE IN PROGRESS* NO GROWTH 2 DAYS       All Labs from Last 24 Hrs:  Recent Results (from the past 24 hour(s))   CBC with Auto Differential    Collection Time: 09/24/22  6:26 AM   Result Value Ref Range    WBC 10.0 4.3 - 11.1 K/uL    RBC 3.34 (L) 4.23 - 5.6 M/uL    Hemoglobin 10.4 (L) 13.6 - 17.2 g/dL    Hematocrit 31.4 (L) 41.1 - 50.3 %    MCV 94.0 79.6 - 97.8 FL    MCH 31.1 26.1 - 32.9 PG    MCHC 33.1 31.4 - 35.0 g/dL    RDW 13.6 11.9 - 14.6 %    Platelets 555 378 - 900 K/uL    MPV 8.9 (L) 9.4 - 12.3 FL    nRBC 0.00 0.0 - 0.2 K/uL    Differential Type AUTOMATED      Seg Neutrophils 71 43 - 78 %    Lymphocytes 21 13 - 44 %    Monocytes 7 4.0 - 12.0 %    Eosinophils % 1 0.5 - 7.8 %    Basophils 0 0.0 - 2.0 %    Immature Granulocytes 0 0.0 - 5.0 %    Segs Absolute 7.2 1.7 - 8.2 K/UL    Absolute Lymph # 2.1 0.5 - 4.6 K/UL    Absolute Mono # 0.7 0.1 - 1.3 K/UL    Absolute Eos # 0.1 0.0 - 0.8 K/UL    Basophils Absolute 0.0 0.0 - 0.2 K/UL    Absolute Immature Granulocyte 0.0 0.0 - 0.5 K/UL   Magnesium    Collection Time: 09/24/22  6:26 AM   Result Value Ref Range    Magnesium 2. 3 1.8 - 2.4 mg/dL   Comprehensive Metabolic Panel    Collection Time: 09/24/22  6:26 AM   Result Value Ref Range    Sodium 142 138 - 145 mmol/L    Potassium 3.7 3.5 - 5.1 mmol/L    Chloride 105 101 - 110 mmol/L    CO2 29 21 - 32 mmol/L    Anion Gap 8 4 - 13 mmol/L    Glucose 110 (H) 65 - 100 mg/dL    BUN 5 (L) 6 - 23 MG/DL    Creatinine 0.50 (L) 0.8 - 1.5 MG/DL    GFR African American >60 >60 ml/min/1.73m2    GFR Non- >60 >60 ml/min/1.73m2    Calcium 8.7 8.3 - 10.4 MG/DL    Total Bilirubin 0.4 0.2 - 1.1 MG/DL    ALT 33 12 - 65 U/L    AST 15 15 - 37 U/L    Alk Phosphatase 119 50 - 136 U/L    Total Protein 5.9 (L) 6.3 - 8.2 g/dL    Albumin 2.7 (L) 3.5 - 5.0 g/dL    Globulin 3.2 2.3 - 3.5 g/dL    Albumin/Globulin Ratio 0.8 (L) 1.2 - 3.5     Vancomycin Level, Random    Collection Time: 09/24/22  6:26 AM   Result Value Ref Range    Vancomycin Rm 12.3 UG/ML       Allergies   Allergen Reactions    Wasp Venom Protein Anaphylaxis    Nicotine Other (See Comments)     Nicotine patch caused nightmare       There is no immunization history on file for this patient. Recent Vital Data:  Patient Vitals for the past 24 hrs:   Temp Pulse Resp BP SpO2   09/24/22 0752 98.8 °F (37.1 °C) 86 17 121/72 93 %   09/24/22 0329 99.3 °F (37.4 °C) (!) 114 16 106/66 96 %   09/23/22 2323 99 °F (37.2 °C) (!) 112 16 125/76 94 %   09/23/22 1934 98.6 °F (37 °C) 92 16 (!) 125/90 98 %   09/23/22 1557 98.5 °F (36.9 °C) 89 18 (!) 125/91 96 %   09/23/22 1240 -- -- -- 106/75 --   09/23/22 1130 98.3 °F (36.8 °C) 88 18 (!) 137/97 93 %       Oxygen Therapy  SpO2: 93 %  O2 Device: None (Room air)    Estimated body mass index is 22.6 kg/m² as calculated from the following:    Height as of this encounter: 5' 6\" (1.676 m). Weight as of this encounter: 140 lb (63.5 kg).     Intake/Output Summary (Last 24 hours) at 9/24/2022 1106  Last data filed at 9/24/2022 0400  Gross per 24 hour   Intake --   Output 450 ml   Net -450 ml         Physical

## 2022-09-25 LAB
BACTERIA SPEC CULT: ABNORMAL
BACTERIA SPEC CULT: ABNORMAL
SERVICE CMNT-IMP: ABNORMAL

## 2022-09-27 LAB
BACTERIA SPEC CULT: NORMAL
SERVICE CMNT-IMP: NORMAL

## 2022-11-10 NOTE — PROGRESS NOTES
Ambulatory/Rehab Services H2 Model Falls Risk Assessment    Risk Factor Pts. ·   Confusion/Disorientation/Impulsivity  []    4 ·   Symptomatic Depression  []   2 ·   Altered Elimination  []   1 ·   Dizziness/Vertigo  []   1 ·   Gender (Male)  [x]   1 ·   Any administered antiepileptics (anticonvulsants):  [x]   2 ·   Any administered benzodiazepines:  []   1 ·   Visual Impairment (specify):  []   1 ·   Portable Oxygen Use  []   1 ·   Orthostatic ? BP  []   1 ·   History of Recent Falls (within 3 mos.)  []   5     Ability to Rise from Chair (choose one) Pts. ·   Ability to rise in a single movement  [x]   0 ·   Pushes up, successful in one attempt  []   1 ·   Multiple attempts, but successful  []   3 ·   Unable to rise without assistance  []   4   Total: (5 or greater = High Risk) 3     Falls Prevention Plan:   []                Physical Limitations to Exercise (specify):   []                Mobility Assistance Device (type):   []                Exercise/Equipment Adaptation (specify):    ©2010 [facility] [address] Patent #[id number].  Federal Law prohibits the replication, distribution or use without written permission from [facility] [employer] I have not heard anything about this and am not sure how to proceed.     I am not aware of any specific safety issues, however drug recalls do happen from time to time.     I could not find any recent information about a recall or safety concerns.    I need more information about this issue.   What safety issue are they concerned about?  Is it an age related thing?  Is this a formulary change?  Can he get it with a prior authorization?   He would qualify for a prior authorization because he has tried the other similar medication (spiroonlactone) and had side effects as listed in his allergy list.     He can always pay cash for it, but it may be spendy.    According to Sydnie its about $40/month from [employer].     I more info before I know what to do next.   Have him forward any information that he received from his insurnace.